# Patient Record
Sex: FEMALE | Race: WHITE | Employment: UNEMPLOYED | URBAN - METROPOLITAN AREA
[De-identification: names, ages, dates, MRNs, and addresses within clinical notes are randomized per-mention and may not be internally consistent; named-entity substitution may affect disease eponyms.]

---

## 2017-05-30 ENCOUNTER — AMBULATORY - RIVER FALLS (OUTPATIENT)
Dept: FAMILY MEDICINE | Facility: CLINIC | Age: 27
End: 2017-05-30

## 2018-06-18 ENCOUNTER — APPOINTMENT (OUTPATIENT)
Dept: CT IMAGING | Facility: CLINIC | Age: 28
End: 2018-06-18
Attending: EMERGENCY MEDICINE
Payer: COMMERCIAL

## 2018-06-18 ENCOUNTER — HOSPITAL ENCOUNTER (EMERGENCY)
Facility: CLINIC | Age: 28
Discharge: HOME | End: 2018-06-18
Attending: EMERGENCY MEDICINE | Admitting: EMERGENCY MEDICINE
Payer: COMMERCIAL

## 2018-06-18 VITALS
SYSTOLIC BLOOD PRESSURE: 115 MMHG | OXYGEN SATURATION: 100 % | RESPIRATION RATE: 26 BRPM | HEART RATE: 97 BPM | TEMPERATURE: 96.8 F | DIASTOLIC BLOOD PRESSURE: 63 MMHG

## 2018-06-18 DIAGNOSIS — R11.10 VOMITING AND DIARRHEA: ICD-10-CM

## 2018-06-18 DIAGNOSIS — R19.7 VOMITING AND DIARRHEA: ICD-10-CM

## 2018-06-18 DIAGNOSIS — R14.2 FLATULENCE, ERUCTATION, AND GAS PAIN: ICD-10-CM

## 2018-06-18 DIAGNOSIS — R14.3 FLATULENCE, ERUCTATION, AND GAS PAIN: ICD-10-CM

## 2018-06-18 DIAGNOSIS — R14.1 FLATULENCE, ERUCTATION, AND GAS PAIN: ICD-10-CM

## 2018-06-18 DIAGNOSIS — K52.9 GASTROENTERITIS: ICD-10-CM

## 2018-06-18 LAB
ALBUMIN SERPL-MCNC: 3.8 G/DL (ref 3.4–5)
ALP SERPL-CCNC: 65 U/L (ref 40–150)
ALT SERPL W P-5'-P-CCNC: 20 U/L (ref 0–50)
ANION GAP SERPL CALCULATED.3IONS-SCNC: 7 MMOL/L (ref 3–14)
AST SERPL W P-5'-P-CCNC: 19 U/L (ref 0–45)
B-HCG FREE SERPL-ACNC: <5 IU/L
BASOPHILS # BLD AUTO: 0 10E9/L (ref 0–0.2)
BASOPHILS NFR BLD AUTO: 0.5 %
BILIRUB DIRECT SERPL-MCNC: 0.1 MG/DL (ref 0–0.2)
BILIRUB SERPL-MCNC: 1.3 MG/DL (ref 0.2–1.3)
BUN SERPL-MCNC: 13 MG/DL (ref 7–30)
CALCIUM SERPL-MCNC: 8.3 MG/DL (ref 8.5–10.1)
CHLORIDE SERPL-SCNC: 109 MMOL/L (ref 94–109)
CO2 SERPL-SCNC: 23 MMOL/L (ref 20–32)
CREAT SERPL-MCNC: 0.95 MG/DL (ref 0.52–1.04)
DIFFERENTIAL METHOD BLD: NORMAL
EOSINOPHIL # BLD AUTO: 0.1 10E9/L (ref 0–0.7)
EOSINOPHIL NFR BLD AUTO: 1.6 %
ERYTHROCYTE [DISTWIDTH] IN BLOOD BY AUTOMATED COUNT: 13.1 % (ref 10–15)
GFR SERPL CREATININE-BSD FRML MDRD: 71 ML/MIN/1.7M2
GLUCOSE SERPL-MCNC: 98 MG/DL (ref 70–99)
HCT VFR BLD AUTO: 42.2 % (ref 35–47)
HGB BLD-MCNC: 14.4 G/DL (ref 11.7–15.7)
IMM GRANULOCYTES # BLD: 0.1 10E9/L (ref 0–0.4)
IMM GRANULOCYTES NFR BLD: 0.7 %
LIPASE SERPL-CCNC: 108 U/L (ref 73–393)
LYMPHOCYTES # BLD AUTO: 1.5 10E9/L (ref 0.8–5.3)
LYMPHOCYTES NFR BLD AUTO: 17 %
MCH RBC QN AUTO: 30.3 PG (ref 26.5–33)
MCHC RBC AUTO-ENTMCNC: 34.1 G/DL (ref 31.5–36.5)
MCV RBC AUTO: 89 FL (ref 78–100)
MONOCYTES # BLD AUTO: 0.6 10E9/L (ref 0–1.3)
MONOCYTES NFR BLD AUTO: 7.3 %
NEUTROPHILS # BLD AUTO: 6.3 10E9/L (ref 1.6–8.3)
NEUTROPHILS NFR BLD AUTO: 72.9 %
NRBC # BLD AUTO: 0 10*3/UL
NRBC BLD AUTO-RTO: 0 /100
PLATELET # BLD AUTO: 207 10E9/L (ref 150–450)
POTASSIUM SERPL-SCNC: 3.2 MMOL/L (ref 3.4–5.3)
PROT SERPL-MCNC: 7.1 G/DL (ref 6.8–8.8)
RBC # BLD AUTO: 4.75 10E12/L (ref 3.8–5.2)
SODIUM SERPL-SCNC: 139 MMOL/L (ref 133–144)
WBC # BLD AUTO: 8.7 10E9/L (ref 4–11)

## 2018-06-18 PROCEDURE — 87493 C DIFF AMPLIFIED PROBE: CPT | Performed by: EMERGENCY MEDICINE

## 2018-06-18 PROCEDURE — 83690 ASSAY OF LIPASE: CPT | Performed by: EMERGENCY MEDICINE

## 2018-06-18 PROCEDURE — 25000128 H RX IP 250 OP 636: Performed by: EMERGENCY MEDICINE

## 2018-06-18 PROCEDURE — 87506 IADNA-DNA/RNA PROBE TQ 6-11: CPT | Performed by: EMERGENCY MEDICINE

## 2018-06-18 PROCEDURE — 80048 BASIC METABOLIC PNL TOTAL CA: CPT | Performed by: EMERGENCY MEDICINE

## 2018-06-18 PROCEDURE — 74177 CT ABD & PELVIS W/CONTRAST: CPT

## 2018-06-18 PROCEDURE — 25000132 ZZH RX MED GY IP 250 OP 250 PS 637: Performed by: EMERGENCY MEDICINE

## 2018-06-18 PROCEDURE — 96361 HYDRATE IV INFUSION ADD-ON: CPT

## 2018-06-18 PROCEDURE — 84702 CHORIONIC GONADOTROPIN TEST: CPT

## 2018-06-18 PROCEDURE — 80076 HEPATIC FUNCTION PANEL: CPT | Performed by: EMERGENCY MEDICINE

## 2018-06-18 PROCEDURE — 96375 TX/PRO/DX INJ NEW DRUG ADDON: CPT

## 2018-06-18 PROCEDURE — 99285 EMERGENCY DEPT VISIT HI MDM: CPT | Mod: 25

## 2018-06-18 PROCEDURE — 85025 COMPLETE CBC W/AUTO DIFF WBC: CPT | Performed by: EMERGENCY MEDICINE

## 2018-06-18 PROCEDURE — 96374 THER/PROPH/DIAG INJ IV PUSH: CPT

## 2018-06-18 RX ORDER — MORPHINE SULFATE 4 MG/ML
4 INJECTION, SOLUTION INTRAMUSCULAR; INTRAVENOUS ONCE
Status: COMPLETED | OUTPATIENT
Start: 2018-06-18 | End: 2018-06-18

## 2018-06-18 RX ORDER — POTASSIUM CHLORIDE 1500 MG/1
40 TABLET, EXTENDED RELEASE ORAL ONCE
Status: COMPLETED | OUTPATIENT
Start: 2018-06-18 | End: 2018-06-18

## 2018-06-18 RX ORDER — LOPERAMIDE HYDROCHLORIDE 2 MG/1
2 TABLET ORAL PRN
Qty: 20 TABLET | Refills: 0 | Status: ON HOLD | OUTPATIENT
Start: 2018-06-18 | End: 2019-05-26

## 2018-06-18 RX ORDER — ONDANSETRON 2 MG/ML
4 INJECTION INTRAMUSCULAR; INTRAVENOUS ONCE
Status: COMPLETED | OUTPATIENT
Start: 2018-06-18 | End: 2018-06-18

## 2018-06-18 RX ORDER — DICYCLOMINE HCL 20 MG
20 TABLET ORAL EVERY 6 HOURS PRN
Qty: 16 TABLET | Refills: 0 | Status: SHIPPED | OUTPATIENT
Start: 2018-06-18 | End: 2018-06-22

## 2018-06-18 RX ORDER — IOPAMIDOL 755 MG/ML
500 INJECTION, SOLUTION INTRAVASCULAR ONCE
Status: COMPLETED | OUTPATIENT
Start: 2018-06-18 | End: 2018-06-18

## 2018-06-18 RX ORDER — DICYCLOMINE HYDROCHLORIDE 10 MG/1
20 CAPSULE ORAL ONCE
Status: COMPLETED | OUTPATIENT
Start: 2018-06-18 | End: 2018-06-18

## 2018-06-18 RX ORDER — ONDANSETRON 4 MG/1
4 TABLET, ORALLY DISINTEGRATING ORAL EVERY 8 HOURS PRN
Qty: 10 TABLET | Refills: 0 | Status: SHIPPED | OUTPATIENT
Start: 2018-06-18 | End: 2018-06-21

## 2018-06-18 RX ADMIN — POTASSIUM CHLORIDE 40 MEQ: 1500 TABLET, EXTENDED RELEASE ORAL at 22:39

## 2018-06-18 RX ADMIN — MORPHINE SULFATE 4 MG: 4 INJECTION, SOLUTION INTRAMUSCULAR; INTRAVENOUS at 21:32

## 2018-06-18 RX ADMIN — MORPHINE SULFATE 4 MG: 4 INJECTION INTRAVENOUS at 21:44

## 2018-06-18 RX ADMIN — SODIUM CHLORIDE 1000 ML: 9 INJECTION, SOLUTION INTRAVENOUS at 21:30

## 2018-06-18 RX ADMIN — DICYCLOMINE HYDROCHLORIDE 20 MG: 10 CAPSULE ORAL at 22:49

## 2018-06-18 RX ADMIN — IOPAMIDOL 75 ML: 755 INJECTION, SOLUTION INTRAVENOUS at 21:29

## 2018-06-18 RX ADMIN — SODIUM CHLORIDE 59 ML: 9 INJECTION, SOLUTION INTRAVENOUS at 21:29

## 2018-06-18 RX ADMIN — ONDANSETRON 4 MG: 2 INJECTION INTRAMUSCULAR; INTRAVENOUS at 21:30

## 2018-06-18 ASSESSMENT — ENCOUNTER SYMPTOMS
BLOOD IN STOOL: 0
DIARRHEA: 1
FREQUENCY: 0
ABDOMINAL PAIN: 1
DYSURIA: 0
VOMITING: 1
NAUSEA: 1

## 2018-06-18 NOTE — ED AVS SNAPSHOT
[facility] Emergency Department    [address]    [facility] [address]    Phone:  [phone]    Fax:  [phone]                                       Laura Song   MRN: [id number]    Department:  [facility] Emergency Department   Date of Visit:  6/18/2018           After Visit Summary Signature Page     I have received my discharge instructions, and my questions have been answered. I have discussed any challenges I see with this plan with the nurse or doctor.    ..........................................................................................................................................  Patient/Patient Representative Signature      ..........................................................................................................................................  Patient Representative Print Name and Relationship to Patient    ..................................................               ................................................  Date                                            Time    ..........................................................................................................................................  Reviewed by Signature/Title    ...................................................              ..............................................  Date                                                            Time

## 2018-06-18 NOTE — ED AVS SNAPSHOT
[facility] Emergency Department    [address]    [facility] [address]    Phone:  [phone]    Fax:  [phone]                                       Laura Song   MRN: [id number]    Department:  [facility] Emergency Department   Date of Visit:  6/18/2018           Patient Information     Date Of Birth          1990        Your diagnoses for this visit were:     Gastroenteritis     Flatulence, eructation, and gas pain     Vomiting and diarrhea        You were seen by Sofia Cottrell MD.      Follow-up Information     Follow up with Clinic, [facility] In 3 days.    Contact information:    [address] [facility] [address]  [phone]          Follow up with [facility] Emergency Department.    Specialty:  EMERGENCY MEDICINE    Why:  If symptoms worsen    Contact information:    [address]  [facility] [address]  [phone]        Discharge Instructions       Bentyl to help with cramping/bloating pain and possibly diarrhea.  Imodium as prescribed for diarrhea and Zofran as needed for nausea or vomiting.  You can also take Tylenol or ibuprofen for pain.  Eat very bland foods and advance diet as symptoms allow.  Follow up with primary care in 2-3 days to ensure you are improving as expected.  Return with new or concerning symptoms including uncontrolled pain.  You will be called if any pending tests are positive. Otherwise you will not be called with negative results.    Discharge References/Attachments     FOOD POISONING OR GASTROENTERITIS (ADULT) (ENGLISH)    GASTROENTERITIS, NONINFECTIOUS (ENGLISH)    VOMITING AND DIARRHEA, NONSPECIFIC (ADULT) (ENGLISH)      24 Hour Appointment Hotline       To make an appointment at any [facility], call [phone] ([phone]). If you don't have a family doctor or clinic, we will help you find one. [facility] are conveniently located to serve the  needs of you and your family.             Review of your medicines      START taking        Dose / Directions Last dose taken    dicyclomine 20 MG tablet   Commonly known as:  BENTYL   Dose:  20 mg   Quantity:  16 tablet        Take 1 tablet (20 mg) by mouth every 6 hours as needed   Refills:  0        loperamide 2 MG tablet   Commonly known as:  IMODIUM A-D   Dose:  2 mg   Quantity:  20 tablet        Take 1 tablet (2 mg) by mouth as needed for diarrhea Take 2 (4mg) tablets after first episode of diarrhea then 1 tablet (2mg) after each subsequent episode not to exceed 8 tablets (16mg) in 24 hours.   Refills:  0        ondansetron 4 MG ODT tab   Commonly known as:  ZOFRAN ODT   Dose:  4 mg   Quantity:  10 tablet        Take 1 tablet (4 mg) by mouth every 8 hours as needed for nausea   Refills:  0                Prescriptions were sent or printed at these locations (3 Prescriptions)                   Other Prescriptions                Printed at Department/Unit printer (3 of 3)         dicyclomine (BENTYL) 20 MG tablet               loperamide (IMODIUM A-D) 2 MG tablet               ondansetron (ZOFRAN ODT) 4 MG ODT tab                Procedures and tests performed during your visit     Basic metabolic panel    CBC with platelets differential    CT Abdomen Pelvis w Contrast    Clostridium difficile toxin B PCR    Enteric Bacteria and Virus Panel by HERMINIO Stool    Hepatic panel    ISTAT HCG Quantitative Pregnancy POCT    Lipase      Orders Needing Specimen Collection     None      Pending Results     Date and Time Order Name Status Description    6/18/2018 2135 Enteric Bacteria and Virus Panel by HERMINIO Stool In process     6/18/2018 2110 Clostridium difficile toxin B PCR In process             Pending Culture Results     Date and Time Order Name Status Description    6/18/2018 2135 Enteric Bacteria and Virus Panel by HERMINIO Stool In process     6/18/2018 2110 Clostridium difficile toxin B PCR In process             Pending  Results Instructions     If you had any lab results that were not finalized at the time of your Discharge, you can call the ED Lab Result RN at [phone]. You will be contacted by this team for any positive Lab results or changes in treatment. The nurses are available 7 days a week from 10A to 6:30P.  You can leave a message 24 hours per day and they will return your call.        Test Results From Your Hospital Stay        6/18/2018  8:50 PM      Component Results     Component Value Ref Range & Units Status    Sodium 139 133 - 144 mmol/L Final    Potassium 3.2 (L) 3.4 - 5.3 mmol/L Final    Chloride 109 94 - 109 mmol/L Final    Carbon Dioxide 23 20 - 32 mmol/L Final    Anion Gap 7 3 - 14 mmol/L Final    Glucose 98 70 - 99 mg/dL Final    Urea Nitrogen 13 7 - 30 mg/dL Final    Creatinine 0.95 0.52 - 1.04 mg/dL Final    GFR Estimate 71 >60 mL/min/1.7m2 Final    Non  GFR Calc    GFR Estimate If Black 86 >60 mL/min/1.7m2 Final    African American GFR Calc    Calcium 8.3 (L) 8.5 - 10.1 mg/dL Final         6/18/2018  8:35 PM      Component Results     Component Value Ref Range & Units Status    WBC 8.7 4.0 - 11.0 10e9/L Final    RBC Count 4.75 3.8 - 5.2 10e12/L Final    Hemoglobin 14.4 11.7 - 15.7 g/dL Final    Hematocrit 42.2 35.0 - 47.0 % Final    MCV 89 78 - 100 fl Final    MCH 30.3 26.5 - 33.0 pg Final    MCHC 34.1 31.5 - 36.5 g/dL Final    RDW 13.1 10.0 - 15.0 % Final    Platelet Count 207 150 - 450 10e9/L Final    Diff Method Automated Method  Final    % Neutrophils 72.9 % Final    % Lymphocytes 17.0 % Final    % Monocytes 7.3 % Final    % Eosinophils 1.6 % Final    % Basophils 0.5 % Final    % Immature Granulocytes 0.7 % Final    Nucleated RBCs 0 0 /100 Final    Absolute Neutrophil 6.3 1.6 - 8.3 10e9/L Final    Absolute Lymphocytes 1.5 0.8 - 5.3 10e9/L Final    Absolute Monocytes 0.6 0.0 - 1.3 10e9/L Final    Absolute Eosinophils 0.1 0.0 - 0.7 10e9/L Final    Absolute Basophils 0.0 0.0 - 0.2  10e9/L Final    Abs Immature Granulocytes 0.1 0 - 0.4 10e9/L Final    Absolute Nucleated RBC 0.0  Final         6/18/2018  9:04 PM      Component Results     Component Value Ref Range & Units Status    HCG Quantitative Serum <5.0 <5.0 IU/L Final         6/18/2018 10:10 PM      Narrative     CT ABDOMEN AND PELVIS WITH CONTRAST 6/18/2018 9:36 PM     HISTORY: Abdominal pain, generalized, including right lower quadrant.       TECHNIQUE: Volumetric acquisition through abdomen and pelvis with  IV  contrast. 79 mL Isovue-370  Radiation dose for this scan was reduced  using automated exposure control, adjustment of the mA and/or kV  according to patient size, or iterative reconstruction technique.    COMPARISON: None.    FINDINGS: The liver, gallbladder, spleen, pancreas, adrenal glands and  kidneys demonstrate no worrisome findings. Visualized lung bases are  clear.    Colon is mildly distended with gas and fluid. There is also increased  small bowel fluid and borderline-prominent loops without convincing  obstruction.    Uterus is present. No suspicious pelvic masses. Small amount of free  pelvic fluid. No loculated fluid collections or free air.        Impression     IMPRESSION:  1. Colon is mildly distended with fluid and gas and there is increased  small bowel fluid. Findings are nonspecific but suggest enteritis.  2. No other acute findings.    DANNIE SUTHERLAND MD         6/18/2018  9:33 PM         6/18/2018  9:37 PM      Component Results     Component Value Ref Range & Units Status    Bilirubin Direct 0.1 0.0 - 0.2 mg/dL Final    Bilirubin Total 1.3 0.2 - 1.3 mg/dL Final    Albumin 3.8 3.4 - 5.0 g/dL Final    Protein Total 7.1 6.8 - 8.8 g/dL Final    Alkaline Phosphatase 65 40 - 150 U/L Final    ALT 20 0 - 50 U/L Final    AST 19 0 - 45 U/L Final         6/18/2018  9:19 PM      Component Results     Component Value Ref Range & Units Status    Lipase 108 73 - 393 U/L Final         6/18/2018 10:04 PM                 Clinical Quality Measure: Blood Pressure Screening     Your blood pressure was checked while you were in the emergency department today. The last reading we obtained was  BP: 115/63 . Please read the guidelines below about what these numbers mean and what you should do about them.  If your systolic blood pressure (the top number) is less than 120 and your diastolic blood pressure (the bottom number) is less than 80, then your blood pressure is normal. There is nothing more that you need to do about it.  If your systolic blood pressure (the top number) is 120-139 or your diastolic blood pressure (the bottom number) is 80-89, your blood pressure may be higher than it should be. You should have your blood pressure rechecked within a year by a primary care provider.  If your systolic blood pressure (the top number) is 140 or greater or your diastolic blood pressure (the bottom number) is 90 or greater, you may have high blood pressure. High blood pressure is treatable, but if left untreated over time it can put you at risk for heart attack, stroke, or kidney failure. You should have your blood pressure rechecked by a primary care provider within the next 4 weeks.  If your provider in the emergency department today gave you specific instructions to follow-up with your doctor or provider even sooner than that, you should follow that instruction and not wait for up to 4 weeks for your follow-up visit.        Thank you for choosing [facility]       Thank you for choosing [facility] for your care. Our goal is always to provide you with excellent care. Hearing back from our patients is one way we can continue to improve our services. Please take a few minutes to complete the written survey that you may receive in the mail after you visit with us. Thank you!        [facility] Information     [employer] lets you send messages to your doctor, view your test results, renew your prescriptions, schedule appointments and more. To sign up,  "go to [email or web address] . Click on \"Log in\" on the left side of the screen, which will take you to the Welcome page. Then click on \"Sign up Now\" on the right side of the page.     You will be asked to enter the access code listed below, as well as some personal information. Please follow the directions to create your username and password.     Your access code is: [email or web address]  Expires: 2018 11:07 PM     Your access code will  in 90 days. If you need help or a new code, please call your [facility] clinic or [phone].        Care EveryWhere ID     This is your Care EveryWhere ID. This could be used by other organizations to access your [facility] medical records  [id number]        Equal Access to Services     JENNA HARRIS : Raghavendra Swanson, michelle key, kay velarde, maral young . So [facility] [phone].    ATENCIÓN: Si habla español, tiene a malhotra disposición servicios gratuitos de asistencia lingüística. Zac al [phone].    We comply with applicable federal civil rights laws and [place] laws. We do not discriminate on the basis of race, color, national origin, age, disability, sex, sexual orientation, or gender identity.            After Visit Summary       This is your record. Keep this with you and show to your community pharmacist(s) and doctor(s) at your next visit.                  "

## 2018-06-19 LAB
C COLI+JEJUNI+LARI FUSA STL QL NAA+PROBE: NOT DETECTED
C DIFF TOX B STL QL: NEGATIVE
EC STX1 GENE STL QL NAA+PROBE: NOT DETECTED
EC STX2 GENE STL QL NAA+PROBE: NOT DETECTED
ENTERIC PATHOGEN COMMENT: NORMAL
NOROV GI+II ORF1-ORF2 JNC STL QL NAA+PR: NOT DETECTED
RVA NSP5 STL QL NAA+PROBE: NOT DETECTED
SALMONELLA SP RPOD STL QL NAA+PROBE: NOT DETECTED
SHIGELLA SP+EIEC IPAH STL QL NAA+PROBE: NOT DETECTED
SPECIMEN SOURCE: NORMAL
V CHOL+PARA RFBL+TRKH+TNAA STL QL NAA+PR: NOT DETECTED
Y ENTERO RECN STL QL NAA+PROBE: NOT DETECTED

## 2018-06-19 ASSESSMENT — ENCOUNTER SYMPTOMS: FEVER: 0

## 2018-06-19 NOTE — ED TRIAGE NOTES
Pt c/o abd cramping and diarrhea with dizziness. occ NV.    Pt A&O x 3, CMS x 3, ABCD's adequate in triage

## 2018-06-19 NOTE — ED NOTES
Pt provided with discharge paperwork and educated on recommended follow-up with PCP. Pt educated on how to manage symptoms at home and how to take prescribed medications. Pt voiced understanding and denied any questions at discharge.

## 2018-06-19 NOTE — ED PROVIDER NOTES
"  History     Chief Complaint:  Abdominal pain, nausea, diarrhea, and vomiting     The history is provided by the patient.      Laura Song is a generally healthy 27 year old female who presents to the emergency department today for evaluation of abdominal pain, nausea, diarrhea, and vomiting. The patient reported she started feeling nauseous yesterday, like she was \"hung over,\" but denies having been drinking. She then reports that today she felt bad gas pain in her abdomen and started having diarrhea in the morning which progressively got worse. She reports more than 30 episodes of non-bloody, watery diarrhea and 3 episodes of non-bloody vomiting. She states she took some Pepto bismol and an antidiarrheal with no relief. She reports her abdominal pain is generalized and cramping which she compares to labor pains. It does radiate toward her back and seems to come in waves, but never completely subsides. No exacerbating factors. She denies fever, dysuria, urinary frequncy or urgency, or vaginal discharge. Of note, patient recently completed Augmentin for sinusitis.    Allergies:  Reglan     Medications:    Adderall  Cymbalta    Past Medical History:    Panic disorder  ADHD  BRAEDEN  Depression  Asthma  Chronic lower back pain  Alcohol dependence  Endometriosis     Past Surgical History:    2 C-sections  Laparoscopy for endometriosis    Family History:    History reviewed. No pertinent family history.      Social History:  The patient was accompanied to the ED by family.  Smoking Status: Never Smoker  Smokeless Tobacco: Never Used  Alcohol Use: Positive   Marital Status:        Review of Systems   Constitutional: Negative for fever.   Gastrointestinal: Positive for abdominal pain, diarrhea, nausea and vomiting. Negative for blood in stool.   Genitourinary: Negative for dysuria, frequency, urgency and vaginal discharge.   All other systems reviewed and are negative.      Physical Exam     Patient Vitals for " the past 24 hrs:   BP Temp Temp src Pulse Resp SpO2   06/18/18 2300 115/63 - - - - -   06/18/18 2245 115/71 - - - - -   06/18/18 2230 117/77 - - - - 100 %   06/18/18 2215 118/72 - - - - 100 %   06/18/18 2200 101/69 - - - - 100 %   06/18/18 2148 - - - - - 100 %   06/18/18 2146 130/85 - - - - -   06/18/18 1945 110/82 96.8  F (36  C) Temporal 97 26 100 %      Physical Exam  General: Well-developed and well-nourished. Uncomfortable appearing young  woman. Cooperative.  Head:  Atraumatic.  Eyes:  Conjunctivae, lids, and sclerae are normal.  ENT:    Normal nose. Moist mucous membranes.  Neck:  Supple. Normal range of motion.  CV:  Regular rate and rhythm. Normal heart sounds with no murmurs, rubs, or gallops detected.  Resp:  No respiratory distress. Clear to auscultation bilaterally without decreased breath sounds, wheezing, rales, or rhonchi.  GI:  Soft. Non-distended. Diffuse moderate tenderness to palpation.     MS:  Normal ROM.   Skin:  Warm. Non-diaphoretic. No pallor.  Neuro:  Awake. A&Ox3. Normal strength.  Psych: Normal mood and affect. Normal speech.  Vitals reviewed.    Emergency Department Course     Imaging:  Radiology findings were communicated with the patient who voiced understanding of the findings.    CT Abdomen Pelvis w Contrast  1. Colon is mildly distended with fluid and gas and there is increased  small bowel fluid. Findings are nonspecific but suggest enteritis.  2. No other acute findings.  DANNIE SUTHERLAND MD  Reading per radiology    Laboratory:  Laboratory findings were communicated with the patient who voiced understanding of the findings.    ISTAT HCG quantitative pregnancy POCT: <5.0  CBC: WBC 8.7, HGB 14.4,   BMP: potassium 3.2 (L) o/w WNL (Creatinine 0.95)  Clostridium difficile toxin B PCR: pending  Enteric bacteria and virus panel by HERMINIO stool: pending     Interventions:  2130 NS 1000 mL IV  2130 Zofran 4 mg IV  2132 Morphine 4 mg IV  2144 Morphine 4 mg IV  2239 Potassium  "chloride 40 mEq  2249 Bentyl 20 mg Oral    Emergency Department Course:    1953 Nursing notes and vitals reviewed.    2020 IV was inserted and blood was drawn for laboratory testing, results above.     2104 I performed an exam of the patient as documented above.     2123 The patient was sent for a abdominal and pelvic CT while in the emergency department, results above.      2234 I updated the patient. States morphine did not significantly change her bloated, cramping \"gas pains.\" Will try Bentyl.    2310 I personally reviewed the imaging and laboratory results with the patient and answered all related questions prior to discharge.    Impression & Plan      Medical Decision Making:  Laura is a 27 year old female who presents with generalized abdominal pain that she describes as cramping in nature associated with vomiting and multiple episodes of watery, nonbloody diarrhea.  No other concerns or complaints.  Patient has generalized tenderness to palpation on exam and otherwise is well-appearing.  Patient was given Zofran, morphine, and IV fluids during her workup.  Fortunately, workup is unremarkable with no leukocytosis.  Creatinine is normal at 0.95 though she has a mild hypokalemia to 3.2 which I repleted with 40 mEq potassium chloride by mouth.  A stool sample was collected and sent for enteric HERMINIO.  Further, patient has recently been on antibiotics so I did send Clostridium difficile PCR.  I believe empiric treatment can safely be deferred until the testing for these pathogens has resulted.  Finally, CT of the abdomen and pelvis reveals only colonic distention with fluid and gas consistent with enteritis.  Thus, patient symptoms are most consistent with a gastroenteritis.  Most commonly this is viral and supportive care is best course of action for treatment.  Again, antibiotics can safely be deferred.  Patient was reevaluated and states morphine did not really help her pain as it seems to be cramping and " bloating primarily.  I did give her Bentyl for this and recommended she continue at home.  Patient has no signs of severe or overwhelming illness and further workup or admission is not indicated. Treatment will consistent of supportive care and symptom control at home.  I discussed appropriate use of Imodium and also provided Zofran as needed for nausea and vomiting.  I recommended she try Tylenol or ibuprofen for pain and eat very bland foods and advance diet only as tolerated.  I recommend follow-up with her primary care provider in the next couple of days to ensure she is improving as expected but provided strict return precautions in the interim.  I answered all patient and her family's questions and they verbalized understanding.  Amenable to discharge.    Diagnosis:    ICD-10-CM    1. Gastroenteritis K52.9    2. Flatulence, eructation, and gas pain R14.3     R14.1     R14.2    3. Vomiting and diarrhea R11.10     R19.7      Disposition:   The patient was discharged home.     Discharge Medications:  New Prescriptions    DICYCLOMINE (BENTYL) 20 MG TABLET    Take 1 tablet (20 mg) by mouth every 6 hours as needed    LOPERAMIDE (IMODIUM A-D) 2 MG TABLET    Take 1 tablet (2 mg) by mouth as needed for diarrhea Take 2 (4mg) tablets after first episode of diarrhea then 1 tablet (2mg) after each subsequent episode not to exceed 8 tablets (16mg) in 24 hours.    ONDANSETRON (ZOFRAN ODT) 4 MG ODT TAB    Take 1 tablet (4 mg) by mouth every 8 hours as needed for nausea     Scribe Disclosure:  I, Chio Lloyd, am serving as a scribe at 7:55 PM on 6/18/2018 to document services personally performed by Sofia Cottrell MD based on my observations and the provider's statements to me.     [facility] EMERGENCY DEPARTMENT       Sofia Cottrell MD  06/19/18 2122

## 2018-06-19 NOTE — DISCHARGE INSTRUCTIONS
Bentyl to help with cramping/bloating pain and possibly diarrhea.  Imodium as prescribed for diarrhea and Zofran as needed for nausea or vomiting.  You can also take Tylenol or ibuprofen for pain.  Eat very bland foods and advance diet as symptoms allow.  Follow up with primary care in 2-3 days to ensure you are improving as expected.  Return with new or concerning symptoms including uncontrolled pain.  You will be called if any pending tests are positive. Otherwise you will not be called with negative results.

## 2018-10-24 LAB
HBV SURFACE AG SERPL QL IA: NEGATIVE
HIV 1+2 AB+HIV1 P24 AG SERPL QL IA: NEGATIVE
RUBELLA ANTIBODY IGG QUANTITATIVE: NORMAL IU/ML
TREPONEMA ANTIBODIES: NEGATIVE

## 2019-04-12 RX ORDER — CITRIC ACID/SODIUM CITRATE 334-500MG
30 SOLUTION, ORAL ORAL
Status: CANCELLED | OUTPATIENT
Start: 2019-04-12

## 2019-04-12 RX ORDER — SODIUM CHLORIDE, SODIUM LACTATE, POTASSIUM CHLORIDE, CALCIUM CHLORIDE 600; 310; 30; 20 MG/100ML; MG/100ML; MG/100ML; MG/100ML
INJECTION, SOLUTION INTRAVENOUS CONTINUOUS
Status: CANCELLED | OUTPATIENT
Start: 2019-04-12

## 2019-04-12 RX ORDER — CEFAZOLIN SODIUM 1 G/3ML
1 INJECTION, POWDER, FOR SOLUTION INTRAMUSCULAR; INTRAVENOUS SEE ADMIN INSTRUCTIONS
Status: CANCELLED | OUTPATIENT
Start: 2019-04-12

## 2019-04-12 RX ORDER — CEFAZOLIN SODIUM 2 G/100ML
2 INJECTION, SOLUTION INTRAVENOUS
Status: CANCELLED | OUTPATIENT
Start: 2019-04-12

## 2019-05-21 ENCOUNTER — HOSPITAL ENCOUNTER (OUTPATIENT)
Facility: CLINIC | Age: 29
Discharge: HOME | End: 2019-05-21
Attending: OBSTETRICS & GYNECOLOGY | Admitting: OBSTETRICS & GYNECOLOGY
Payer: COMMERCIAL

## 2019-05-21 VITALS
HEART RATE: 82 BPM | SYSTOLIC BLOOD PRESSURE: 133 MMHG | BODY MASS INDEX: 30.82 KG/M2 | WEIGHT: 185 LBS | RESPIRATION RATE: 18 BRPM | TEMPERATURE: 98.4 F | HEIGHT: 65 IN | DIASTOLIC BLOOD PRESSURE: 80 MMHG

## 2019-05-21 LAB
ALBUMIN UR-MCNC: NEGATIVE MG/DL
APPEARANCE UR: CLEAR
BILIRUB UR QL STRIP: NEGATIVE
COLOR UR AUTO: ABNORMAL
GLUCOSE UR STRIP-MCNC: NEGATIVE MG/DL
HGB UR QL STRIP: NEGATIVE
KETONES UR STRIP-MCNC: NEGATIVE MG/DL
LEUKOCYTE ESTERASE UR QL STRIP: NEGATIVE
MUCOUS THREADS #/AREA URNS LPF: PRESENT /LPF
NITRATE UR QL: NEGATIVE
PH UR STRIP: 6.5 PH (ref 5–7)
RBC #/AREA URNS AUTO: 1 /HPF (ref 0–2)
SOURCE: ABNORMAL
SP GR UR STRIP: 1.01 (ref 1–1.03)
SQUAMOUS #/AREA URNS AUTO: 4 /HPF (ref 0–1)
UROBILINOGEN UR STRIP-MCNC: NORMAL MG/DL (ref 0–2)
WBC #/AREA URNS AUTO: 1 /HPF (ref 0–5)

## 2019-05-21 PROCEDURE — G0463 HOSPITAL OUTPT CLINIC VISIT: HCPCS | Mod: 25

## 2019-05-21 PROCEDURE — 25000132 ZZH RX MED GY IP 250 OP 250 PS 637: Performed by: OBSTETRICS & GYNECOLOGY

## 2019-05-21 PROCEDURE — 81001 URINALYSIS AUTO W/SCOPE: CPT | Performed by: OBSTETRICS & GYNECOLOGY

## 2019-05-21 PROCEDURE — 59025 FETAL NON-STRESS TEST: CPT

## 2019-05-21 RX ORDER — PRENATAL VIT/IRON FUM/FOLIC AC 27MG-0.8MG
1 TABLET ORAL DAILY
COMMUNITY

## 2019-05-21 RX ORDER — CYCLOBENZAPRINE HCL 5 MG
5 TABLET ORAL 3 TIMES DAILY PRN
Status: ON HOLD | COMMUNITY
End: 2019-06-07

## 2019-05-21 RX ORDER — ONDANSETRON 2 MG/ML
4 INJECTION INTRAMUSCULAR; INTRAVENOUS EVERY 6 HOURS PRN
Status: DISCONTINUED | OUTPATIENT
Start: 2019-05-21 | End: 2019-05-21 | Stop reason: HOSPADM

## 2019-05-21 RX ORDER — TERCONAZOLE 0.4 %
1 CREAM WITH APPLICATOR VAGINAL AT BEDTIME
Status: ON HOLD | COMMUNITY
End: 2019-05-26

## 2019-05-21 RX ORDER — ACETAMINOPHEN 500 MG
1000 TABLET ORAL ONCE
Status: COMPLETED | OUTPATIENT
Start: 2019-05-21 | End: 2019-05-21

## 2019-05-21 RX ORDER — CYCLOBENZAPRINE HCL 10 MG
10 TABLET ORAL ONCE
Status: COMPLETED | OUTPATIENT
Start: 2019-05-21 | End: 2019-05-21

## 2019-05-21 RX ADMIN — CYCLOBENZAPRINE HYDROCHLORIDE 10 MG: 10 TABLET, FILM COATED ORAL at 18:39

## 2019-05-21 RX ADMIN — ACETAMINOPHEN 1000 MG: 500 TABLET, FILM COATED ORAL at 17:43

## 2019-05-21 ASSESSMENT — MIFFLIN-ST. JEOR: SCORE: 1575.03

## 2019-05-21 NOTE — PROGRESS NOTES
"OB observation note    HPI: Patient presents to OBS for evaluation of moderate lower back pain and mild contractions. Reports onset of back pain earlier today followed by uterine tightening the past 2 hours. Denies trauma or injury. States her contractions are tight, but not painful. Denies vaginal bleeding or leakage of fluid. Baby is active. Patient endorses recent vaginal yeast infection for which she is currently taking Monistat (day 4/). She denies fevers, chills, nausea, vomiting. No dysuria, frequency or urgency. She last ate approximately 2 hours ago. Of note, patient has been taking occasional Flexeril for lower uterine cramping to help her sleep, last dose 2 days ago.     OB hx -   1. Term  \"emergency\"   2. Repeat  at 36+6, PROM    GYN hx -   No abnormal paps  Endometriosis diagnosed on laparoscopy     Med hx-   1. Anxiety  2. Depression  3. Migraines  4. Endometriosis  5. Genital herpes    Surg hx -  1. T&A as a child  2. Laparoscopy  for endometriosis  3. IUD insertion/removal  4. Nexplanon insertion/removal  5.  x 2 ( & )    Meds  1. Prenatal vitamins  2. Flexeril    Allergies - Reglan \"panic\"  Soc hx - , h/o tobacco use (quit ), no current alcohol use    O:   Vitals:    19 1735   BP: 133/80   Pulse: 82   Resp: 18   Temp: 98.4  F (36.9  C)   TempSrc: Oral   Weight: 83.9 kg (185 lb)   Height: 1.651 m (5' 5\")     NAD but uncomfortable appearing when changing positions.  LCTAB  RRR  Abd - gravid, non tender.  Moderate right CVA tenderness, mild left CVA tenderness.    FHT - 140, moderate variability, ++accels, no decels (category 1)  [place] - irritability with occ ctx  SVE - cervix closed per RN    U/A - neg blood, neg protein, neg leukocytes, SG 1.007    A/P 29 yo  @ 33+5 with acute onset low back pain.  1.  Ddx on presentation included labor, abruption, pyleonephritis, nephrolithiasis or muscloskeletal pain.  Based on negative " urine sample and lack of fever, very unlikely infection or nephrolithiasis.  2.  No evidence of labor or abruption. Fetal status reassuring.  3.  Likely musculoskeletal - treat with tylenol, Flexeril, heat.   4.  Anticipate discharge home later this evening. Follow up later this week in office.    Loni Walls MD

## 2019-05-21 NOTE — PROVIDER NOTIFICATION
19 1720   Provider Notification   Provider Name/Title Dr. Walls   Method of Notification At Bedside   Request Evaluate in Person   Notification Reason Pain    here at 33w4d for back pain and abdominal tightening.  EFM applied x2 and explained.  Pt states that the constant back pain started around 11am.  Orders received to send a UA.  Unable to collect a FFN due to a yeast infection and pt inserted the cream in the vagina last night.  Cervical exam.  Ok for tylenol.  Will notify MD of results.

## 2019-05-22 NOTE — PROVIDER NOTIFICATION
05/21/19 1943   Provider Notification   Provider Name/Title Dr. Walls   Method of Notification Phone   Request Evaluate - Remote   Notification Reason Status Update   Dr. Walls notified that pt's pain has decreased and feels comfortable going home.  She has an appointment this Friday. Orders received to discharge to home.

## 2019-05-22 NOTE — PROVIDER NOTIFICATION
05/21/19 1810   Provider Notification   Provider Name/Title Dr. Walls   Method of Notification Phone   Request Evaluate - Remote   Notification Reason Lab/Diagnostic Study;Status Update   Md notified of UA results which were normal, fht's, no contractions and still is having back pain.  Orders received to give pt a dose of flexeril 10mg once and watch for a little longer.

## 2019-05-22 NOTE — DISCHARGE INSTRUCTIONS
Discharge Instruction for Undelivered Patients      You were seen for: abdominal and back pain  We Consulted: Dr. Walls  You had (Test or Medicine):fetal monitoring, UA, oral flexeril, cervical exam     Diet:   Drink 8 to 12 glasses of liquids (milk, juice, water) every day.  You may eat meals and snacks.     Activity:  Count fetal kicks everyday (see handout)  Call your doctor or nurse midwife if your baby is moving less than usual.     Call your provider if you notice:  Swelling in your face or increased swelling in your hands or legs.  Headaches that are not relieved by Tylenol (acetaminophen).  Changes in your vision (blurring: seeing spots or stars.)  Nausea (sick to your stomach) and vomiting (throwing up).   Weight gain of 5 pounds or more per week.  Heartburn that doesn't go away.  Signs of bladder infection: pain when you urinate (use the toilet), need to go more often and more urgently.  The bag of vargas (rupture of membranes) breaks, or you notice leaking in your underwear.  Bright red blood in your underwear.  Abdominal (lower belly) or stomach pain.  Second (plus) baby: Contractions (tightening) less than 10 minutes apart and getting stronger.  *If less than 34 weeks: Contractions (tightenings) more than 6 times in one hour.  Increase or change in vaginal discharge (note the color and amount)  Other: Ok to take tylenol and flexeril if needed.    Follow-up:  As scheduled in the clinic

## 2019-05-22 NOTE — PLAN OF CARE
Data: Patient assessed in the [facility] for abdominal pain and back pain.  Cervical exam closed.  Membranes intact.  Contractions rare.  Action:  Presumed adequate fetal oxygenation documented (see flow record). Discharge instructions reviewed.  Patient instructed to report change in fetal movement, vaginal leaking of fluid or bleeding, abdominal pain, or any concerns related to the pregnancy to her nurse/physician.    Response: Orders to discharge home per Loni Walls Md.  Patient verbalized understanding of education and verbalized agreement with plan. Discharged to home at 1950.

## 2019-05-26 ENCOUNTER — HOSPITAL ENCOUNTER (OUTPATIENT)
Facility: CLINIC | Age: 29
LOS: 1 days | Discharge: HOME | End: 2019-05-26
Attending: OBSTETRICS & GYNECOLOGY | Admitting: OBSTETRICS & GYNECOLOGY
Payer: COMMERCIAL

## 2019-05-26 VITALS
SYSTOLIC BLOOD PRESSURE: 124 MMHG | RESPIRATION RATE: 18 BRPM | DIASTOLIC BLOOD PRESSURE: 72 MMHG | HEART RATE: 81 BPM | TEMPERATURE: 98.2 F

## 2019-05-26 LAB
ALT SERPL W P-5'-P-CCNC: 17 U/L (ref 0–50)
ANION GAP SERPL CALCULATED.3IONS-SCNC: 8 MMOL/L (ref 3–14)
AST SERPL W P-5'-P-CCNC: 19 U/L (ref 0–45)
BUN SERPL-MCNC: 6 MG/DL (ref 7–30)
CALCIUM SERPL-MCNC: 8.6 MG/DL (ref 8.5–10.1)
CHLORIDE SERPL-SCNC: 109 MMOL/L (ref 94–109)
CO2 SERPL-SCNC: 21 MMOL/L (ref 20–32)
CREAT SERPL-MCNC: 0.65 MG/DL (ref 0.52–1.04)
CREAT UR-MCNC: 43 MG/DL
ERYTHROCYTE [DISTWIDTH] IN BLOOD BY AUTOMATED COUNT: 12.6 % (ref 10–15)
GFR SERPL CREATININE-BSD FRML MDRD: >90 ML/MIN/{1.73_M2}
GLUCOSE SERPL-MCNC: 88 MG/DL (ref 70–99)
HCT VFR BLD AUTO: 33.6 % (ref 35–47)
HGB BLD-MCNC: 11.2 G/DL (ref 11.7–15.7)
MCH RBC QN AUTO: 28.5 PG (ref 26.5–33)
MCHC RBC AUTO-ENTMCNC: 33.3 G/DL (ref 31.5–36.5)
MCV RBC AUTO: 86 FL (ref 78–100)
PLATELET # BLD AUTO: 178 10E9/L (ref 150–450)
POTASSIUM SERPL-SCNC: 3.6 MMOL/L (ref 3.4–5.3)
PROT UR-MCNC: 0.07 G/L
PROT/CREAT 24H UR: 0.17 G/G CR (ref 0–0.2)
RBC # BLD AUTO: 3.93 10E12/L (ref 3.8–5.2)
SODIUM SERPL-SCNC: 138 MMOL/L (ref 133–144)
URATE SERPL-MCNC: 4.3 MG/DL (ref 2.6–6)
WBC # BLD AUTO: 8.5 10E9/L (ref 4–11)

## 2019-05-26 PROCEDURE — G0463 HOSPITAL OUTPT CLINIC VISIT: HCPCS

## 2019-05-26 PROCEDURE — 84450 TRANSFERASE (AST) (SGOT): CPT | Performed by: OBSTETRICS & GYNECOLOGY

## 2019-05-26 PROCEDURE — 84550 ASSAY OF BLOOD/URIC ACID: CPT | Performed by: OBSTETRICS & GYNECOLOGY

## 2019-05-26 PROCEDURE — 36415 COLL VENOUS BLD VENIPUNCTURE: CPT | Performed by: OBSTETRICS & GYNECOLOGY

## 2019-05-26 PROCEDURE — 59025 FETAL NON-STRESS TEST: CPT

## 2019-05-26 PROCEDURE — 84460 ALANINE AMINO (ALT) (SGPT): CPT | Performed by: OBSTETRICS & GYNECOLOGY

## 2019-05-26 PROCEDURE — 25000132 ZZH RX MED GY IP 250 OP 250 PS 637: Performed by: OBSTETRICS & GYNECOLOGY

## 2019-05-26 PROCEDURE — 80048 BASIC METABOLIC PNL TOTAL CA: CPT | Performed by: OBSTETRICS & GYNECOLOGY

## 2019-05-26 PROCEDURE — 84156 ASSAY OF PROTEIN URINE: CPT | Performed by: OBSTETRICS & GYNECOLOGY

## 2019-05-26 PROCEDURE — 25800030 ZZH RX IP 258 OP 636: Performed by: OBSTETRICS & GYNECOLOGY

## 2019-05-26 PROCEDURE — 85027 COMPLETE CBC AUTOMATED: CPT | Performed by: OBSTETRICS & GYNECOLOGY

## 2019-05-26 RX ORDER — NIFEDIPINE 10 MG/1
20 CAPSULE ORAL ONCE
Status: COMPLETED | OUTPATIENT
Start: 2019-05-26 | End: 2019-05-26

## 2019-05-26 RX ORDER — ONDANSETRON 2 MG/ML
4 INJECTION INTRAMUSCULAR; INTRAVENOUS EVERY 6 HOURS PRN
Status: DISCONTINUED | OUTPATIENT
Start: 2019-05-26 | End: 2019-05-27 | Stop reason: HOSPADM

## 2019-05-26 RX ORDER — SODIUM CHLORIDE, SODIUM LACTATE, POTASSIUM CHLORIDE, CALCIUM CHLORIDE 600; 310; 30; 20 MG/100ML; MG/100ML; MG/100ML; MG/100ML
INJECTION, SOLUTION INTRAVENOUS CONTINUOUS
Status: DISCONTINUED | OUTPATIENT
Start: 2019-05-26 | End: 2019-05-27 | Stop reason: HOSPADM

## 2019-05-26 RX ADMIN — NIFEDIPINE 20 MG: 10 CAPSULE ORAL at 21:01

## 2019-05-26 RX ADMIN — SODIUM CHLORIDE, POTASSIUM CHLORIDE, SODIUM LACTATE AND CALCIUM CHLORIDE: 600; 310; 30; 20 INJECTION, SOLUTION INTRAVENOUS at 21:39

## 2019-05-26 RX ADMIN — SODIUM CHLORIDE, POTASSIUM CHLORIDE, SODIUM LACTATE AND CALCIUM CHLORIDE: 600; 310; 30; 20 INJECTION, SOLUTION INTRAVENOUS at 19:04

## 2019-05-26 NOTE — PLAN OF CARE
Data: Patient presented to [facility]: 2019  5:10 PM.  Reason for maternal/fetal assessment is elevated blood pressures. Patient reports having a HA for 3 days, and today was at grocery store and used the bp cuff there and it was 140's/90's, pt also stated that her vision is not very clear at times d/t the HA.  Patient is a .  Prenatal record reviewed. Pregnancy has been uncomplicated..  Gestational Age 34w2d. VSS. Fetal movement present. Patient denies uterine contractions, leaking of vaginal fluid/rupture of membranes, vaginal bleeding, abdominal pain, pelvic pressure, nausea, vomiting, epigastric or URQ pain, significant edema. Support person is not present.   Action: Verbal consent for EFM. Triage assessment completed. Bill of rights reviewed.  Response: Patient verbalized agreement with plan. Will contact Dr Allyssa Shah with update and further orders.

## 2019-05-26 NOTE — PROVIDER NOTIFICATION
19 1840   Provider Notification   Provider Name/Title Dr. Shah   Method of Notification Phone   Notification Reason Status Update     Updated of pt arrival and Hx, pih labs results are WNL, reviewed bp results, x1 elevated otherwise wnl. Pt continues to have HA rated 3/10, utilized tylenol this am that did not give any relief, some visual disturbances with HA, pt does have Hx of migraines.  Strip was not reactive initially, mod variability/baseline 125's/no decels/x1 accel, extended reactive strip. Pt is claudia q5-8 min, initially pt stated was mild cramp, not reported as increasing in intensity of cramp and palpating mild.  Pt is rpt c/s and hx  labor.  Received telephone order for 1L of LR, and check bp's q30 min, and x1 dose of ibuprofen 400 mg for the HA. Telephone order with read back.

## 2019-05-27 NOTE — DISCHARGE INSTRUCTIONS
Discharge Instruction for Undelivered Patients      You were seen for: Labor Assessment and rule out preeclampsia  We Consulted: Dr. Shah  You had (Test or Medicine):fetal monitoring, blood work     Diet:   Drink 8 to 12 glasses of liquids (milk, juice, water) every day.  You may eat meals and snacks.     Activity:  Call your doctor or nurse midwife if your baby is moving less than usual.     Call your provider if you notice:  Swelling in your face or increased swelling in your hands or legs.  Headaches that are not relieved by Tylenol (acetaminophen).  Changes in your vision (blurring: seeing spots or stars.)  Nausea (sick to your stomach) and vomiting (throwing up).   Weight gain of 5 pounds or more per week.  Heartburn that doesn't go away.  Signs of bladder infection: pain when you urinate (use the toilet), need to go more often and more urgently.  The bag of vargas (rupture of membranes) breaks, or you notice leaking in your underwear.  Bright red blood in your underwear.  Abdominal (lower belly) or stomach pain.  For first baby: Contractions (tightening) less than 5 minutes apart for one hour or more.  Second (plus) baby: Contractions (tightening) less than 10 minutes apart and getting stronger.  *If less than 34 weeks: Contractions (tightenings) more than 6 times in one hour.  Increase or change in vaginal discharge (note the color and amount)  Other: Take it easy until you are seen in the clinic. No strenuous activity, avoid unnecessary walking and minimize household chores.     Follow-up:  In clinic Tuesday or Wednesday

## 2019-05-27 NOTE — PROVIDER NOTIFICATION
05/26/19 2025   Provider Notification   Provider Name/Title Dr. Shah   Method of Notification Phone   Request Evaluate - Remote   Notification Reason Status Update     OB updated BPs 100-120/50-70s. Patient has gotten 1L fluid bolus. Contractions have spaced out to every 6-8 minutes, patient states the intensity is the same. Contractions palpate very mild. Order recieved for 20mg dose of Nifedipine PO.

## 2019-05-27 NOTE — PROVIDER NOTIFICATION
05/26/19 2233   Provider Notification   Provider Name/Title Dr. Shah   Method of Notification Phone   Request Evaluate - Remote   Notification Reason Status Update     OB updated contractions have stopped. Category 1 tracing. Order received to discharge patient home. Have patient follow-up Tuesday or Wednesday in clinic and patient needs to take it easy until appointment.

## 2019-05-31 LAB — GROUP B STREP PCR: NEGATIVE

## 2019-06-03 RX ORDER — CITRIC ACID/SODIUM CITRATE 334-500MG
30 SOLUTION, ORAL ORAL
Status: CANCELLED | OUTPATIENT
Start: 2019-06-03

## 2019-06-03 RX ORDER — CEFAZOLIN SODIUM 2 G/100ML
2 INJECTION, SOLUTION INTRAVENOUS
Status: CANCELLED | OUTPATIENT
Start: 2019-06-03

## 2019-06-03 RX ORDER — SODIUM CHLORIDE, SODIUM LACTATE, POTASSIUM CHLORIDE, CALCIUM CHLORIDE 600; 310; 30; 20 MG/100ML; MG/100ML; MG/100ML; MG/100ML
INJECTION, SOLUTION INTRAVENOUS CONTINUOUS
Status: CANCELLED | OUTPATIENT
Start: 2019-06-03

## 2019-06-03 RX ORDER — CEFAZOLIN SODIUM 1 G/3ML
1 INJECTION, POWDER, FOR SOLUTION INTRAMUSCULAR; INTRAVENOUS SEE ADMIN INSTRUCTIONS
Status: CANCELLED | OUTPATIENT
Start: 2019-06-03

## 2019-06-04 ENCOUNTER — ANESTHESIA (OUTPATIENT)
Dept: SURGERY | Facility: CLINIC | Age: 29
End: 2019-06-04
Payer: COMMERCIAL

## 2019-06-04 ENCOUNTER — ANESTHESIA EVENT (OUTPATIENT)
Dept: SURGERY | Facility: CLINIC | Age: 29
End: 2019-06-04
Payer: COMMERCIAL

## 2019-06-04 ENCOUNTER — HOSPITAL ENCOUNTER (INPATIENT)
Facility: CLINIC | Age: 29
LOS: 3 days | Discharge: HOME | End: 2019-06-07
Attending: OBSTETRICS & GYNECOLOGY | Admitting: OBSTETRICS & GYNECOLOGY
Payer: COMMERCIAL

## 2019-06-04 DIAGNOSIS — Z98.891 S/P C-SECTION: Primary | ICD-10-CM

## 2019-06-04 LAB
ABO + RH BLD: NORMAL
ABO + RH BLD: NORMAL
ALBUMIN UR-MCNC: NEGATIVE MG/DL
AMPHETAMINES UR QL SCN: NEGATIVE
APPEARANCE UR: CLEAR
BILIRUB UR QL STRIP: NEGATIVE
BLD GP AB SCN SERPL QL: NORMAL
BLOOD BANK CMNT PATIENT-IMP: NORMAL
CANNABINOIDS UR QL: NEGATIVE
COCAINE UR QL: NEGATIVE
COLOR UR AUTO: YELLOW
FERN CRYSTALLIZATION: NORMAL
GLUCOSE UR STRIP-MCNC: NEGATIVE MG/DL
HGB BLD-MCNC: 11.6 G/DL (ref 11.7–15.7)
HGB UR QL STRIP: NEGATIVE
KETONES UR STRIP-MCNC: NEGATIVE MG/DL
LEUKOCYTE ESTERASE UR QL STRIP: ABNORMAL
MUCOUS THREADS #/AREA URNS LPF: PRESENT /LPF
NITRATE UR QL: NEGATIVE
OPIATES UR QL SCN: NEGATIVE
PCP UR QL SCN: NEGATIVE
PH UR STRIP: 6.5 PH (ref 5–7)
RBC #/AREA URNS AUTO: <1 /HPF (ref 0–2)
RUPTURE OF FETAL MEMBRANES BY ROM PLUS: NEGATIVE
SOURCE: ABNORMAL
SP GR UR STRIP: 1.02 (ref 1–1.03)
SPECIMEN EXP DATE BLD: NORMAL
SQUAMOUS #/AREA URNS AUTO: 4 /HPF (ref 0–1)
UROBILINOGEN UR STRIP-MCNC: NORMAL MG/DL (ref 0–2)
WBC #/AREA URNS AUTO: 1 /HPF (ref 0–5)

## 2019-06-04 PROCEDURE — 25000125 ZZHC RX 250: Performed by: OBSTETRICS & GYNECOLOGY

## 2019-06-04 PROCEDURE — 27210794 ZZH OR GENERAL SUPPLY STERILE: Performed by: OBSTETRICS & GYNECOLOGY

## 2019-06-04 PROCEDURE — 37000009 ZZH ANESTHESIA TECHNICAL FEE, EACH ADDTL 15 MIN: Performed by: OBSTETRICS & GYNECOLOGY

## 2019-06-04 PROCEDURE — 86901 BLOOD TYPING SEROLOGIC RH(D): CPT | Performed by: OBSTETRICS & GYNECOLOGY

## 2019-06-04 PROCEDURE — 86900 BLOOD TYPING SEROLOGIC ABO: CPT | Performed by: OBSTETRICS & GYNECOLOGY

## 2019-06-04 PROCEDURE — 86850 RBC ANTIBODY SCREEN: CPT | Performed by: OBSTETRICS & GYNECOLOGY

## 2019-06-04 PROCEDURE — 36000056 ZZH SURGERY LEVEL 3 1ST 30 MIN: Performed by: OBSTETRICS & GYNECOLOGY

## 2019-06-04 PROCEDURE — 85018 HEMOGLOBIN: CPT | Performed by: OBSTETRICS & GYNECOLOGY

## 2019-06-04 PROCEDURE — 81001 URINALYSIS AUTO W/SCOPE: CPT | Performed by: OBSTETRICS & GYNECOLOGY

## 2019-06-04 PROCEDURE — 25000132 ZZH RX MED GY IP 250 OP 250 PS 637: Performed by: OBSTETRICS & GYNECOLOGY

## 2019-06-04 PROCEDURE — 88302 TISSUE EXAM BY PATHOLOGIST: CPT | Performed by: OBSTETRICS & GYNECOLOGY

## 2019-06-04 PROCEDURE — 59025 FETAL NON-STRESS TEST: CPT

## 2019-06-04 PROCEDURE — 12000000 ZZH R&B MED SURG/OB

## 2019-06-04 PROCEDURE — 25000125 ZZHC RX 250: Performed by: NURSE ANESTHETIST, CERTIFIED REGISTERED

## 2019-06-04 PROCEDURE — 37000008 ZZH ANESTHESIA TECHNICAL FEE, 1ST 30 MIN: Performed by: OBSTETRICS & GYNECOLOGY

## 2019-06-04 PROCEDURE — 25000128 H RX IP 250 OP 636: Performed by: OBSTETRICS & GYNECOLOGY

## 2019-06-04 PROCEDURE — G0463 HOSPITAL OUTPT CLINIC VISIT: HCPCS | Mod: 25

## 2019-06-04 PROCEDURE — 84112 EVAL AMNIOTIC FLUID PROTEIN: CPT | Performed by: OBSTETRICS & GYNECOLOGY

## 2019-06-04 PROCEDURE — 25800030 ZZH RX IP 258 OP 636: Performed by: OBSTETRICS & GYNECOLOGY

## 2019-06-04 PROCEDURE — 0UB70ZZ EXCISION OF BILATERAL FALLOPIAN TUBES, OPEN APPROACH: ICD-10-PCS | Performed by: OBSTETRICS & GYNECOLOGY

## 2019-06-04 PROCEDURE — 25000128 H RX IP 250 OP 636: Performed by: NURSE ANESTHETIST, CERTIFIED REGISTERED

## 2019-06-04 PROCEDURE — 36415 COLL VENOUS BLD VENIPUNCTURE: CPT | Performed by: OBSTETRICS & GYNECOLOGY

## 2019-06-04 PROCEDURE — 88302 TISSUE EXAM BY PATHOLOGIST: CPT | Mod: 26 | Performed by: OBSTETRICS & GYNECOLOGY

## 2019-06-04 PROCEDURE — 25000125 ZZHC RX 250: Performed by: ANESTHESIOLOGY

## 2019-06-04 PROCEDURE — 36000058 ZZH SURGERY LEVEL 3 EA 15 ADDTL MIN: Performed by: OBSTETRICS & GYNECOLOGY

## 2019-06-04 PROCEDURE — 25000128 H RX IP 250 OP 636: Performed by: ANESTHESIOLOGY

## 2019-06-04 PROCEDURE — 80307 DRUG TEST PRSMV CHEM ANLYZR: CPT | Performed by: OBSTETRICS & GYNECOLOGY

## 2019-06-04 PROCEDURE — 86780 TREPONEMA PALLIDUM: CPT | Performed by: OBSTETRICS & GYNECOLOGY

## 2019-06-04 PROCEDURE — 71000014 ZZH RECOVERY PHASE 1 LEVEL 2 FIRST HR: Performed by: OBSTETRICS & GYNECOLOGY

## 2019-06-04 RX ORDER — ONDANSETRON 4 MG/1
4 TABLET, ORALLY DISINTEGRATING ORAL EVERY 30 MIN PRN
Status: CANCELLED | OUTPATIENT
Start: 2019-06-04

## 2019-06-04 RX ORDER — HYDROCORTISONE 2.5 %
CREAM (GRAM) TOPICAL 3 TIMES DAILY PRN
Status: DISCONTINUED | OUTPATIENT
Start: 2019-06-04 | End: 2019-06-07 | Stop reason: HOSPADM

## 2019-06-04 RX ORDER — NALOXONE HYDROCHLORIDE 0.4 MG/ML
.1-.4 INJECTION, SOLUTION INTRAMUSCULAR; INTRAVENOUS; SUBCUTANEOUS
Status: DISCONTINUED | OUTPATIENT
Start: 2019-06-04 | End: 2019-06-07 | Stop reason: HOSPADM

## 2019-06-04 RX ORDER — SIMETHICONE 80 MG
80 TABLET,CHEWABLE ORAL 4 TIMES DAILY PRN
Status: DISCONTINUED | OUTPATIENT
Start: 2019-06-04 | End: 2019-06-07 | Stop reason: HOSPADM

## 2019-06-04 RX ORDER — CEFAZOLIN SODIUM 1 G/3ML
1 INJECTION, POWDER, FOR SOLUTION INTRAMUSCULAR; INTRAVENOUS SEE ADMIN INSTRUCTIONS
Status: DISCONTINUED | OUTPATIENT
Start: 2019-06-04 | End: 2019-06-04 | Stop reason: HOSPADM

## 2019-06-04 RX ORDER — OXYTOCIN 10 [USP'U]/ML
10 INJECTION, SOLUTION INTRAMUSCULAR; INTRAVENOUS
Status: DISCONTINUED | OUTPATIENT
Start: 2019-06-04 | End: 2019-06-07 | Stop reason: HOSPADM

## 2019-06-04 RX ORDER — DEXTROSE, SODIUM CHLORIDE, SODIUM LACTATE, POTASSIUM CHLORIDE, AND CALCIUM CHLORIDE 5; .6; .31; .03; .02 G/100ML; G/100ML; G/100ML; G/100ML; G/100ML
INJECTION, SOLUTION INTRAVENOUS CONTINUOUS
Status: DISCONTINUED | OUTPATIENT
Start: 2019-06-04 | End: 2019-06-05 | Stop reason: CLARIF

## 2019-06-04 RX ORDER — PROMETHAZINE HYDROCHLORIDE 25 MG/ML
25 INJECTION INTRAMUSCULAR; INTRAVENOUS ONCE
Status: COMPLETED | OUTPATIENT
Start: 2019-06-04 | End: 2019-06-04

## 2019-06-04 RX ORDER — IBUPROFEN 800 MG/1
800 TABLET, FILM COATED ORAL EVERY 6 HOURS PRN
Status: DISCONTINUED | OUTPATIENT
Start: 2019-06-05 | End: 2019-06-07 | Stop reason: HOSPADM

## 2019-06-04 RX ORDER — AMOXICILLIN 250 MG
2 CAPSULE ORAL 2 TIMES DAILY PRN
Status: DISCONTINUED | OUTPATIENT
Start: 2019-06-04 | End: 2019-06-07 | Stop reason: HOSPADM

## 2019-06-04 RX ORDER — BISACODYL 10 MG
10 SUPPOSITORY, RECTAL RECTAL DAILY PRN
Status: DISCONTINUED | OUTPATIENT
Start: 2019-06-06 | End: 2019-06-07 | Stop reason: HOSPADM

## 2019-06-04 RX ORDER — KETOROLAC TROMETHAMINE 30 MG/ML
30 INJECTION, SOLUTION INTRAMUSCULAR; INTRAVENOUS EVERY 6 HOURS
Status: COMPLETED | OUTPATIENT
Start: 2019-06-04 | End: 2019-06-05

## 2019-06-04 RX ORDER — LANOLIN 100 %
OINTMENT (GRAM) TOPICAL
Status: DISCONTINUED | OUTPATIENT
Start: 2019-06-04 | End: 2019-06-07 | Stop reason: HOSPADM

## 2019-06-04 RX ORDER — PHENYLEPHRINE HYDROCHLORIDE 10 MG/ML
INJECTION INTRAVENOUS PRN
Status: DISCONTINUED | OUTPATIENT
Start: 2019-06-04 | End: 2019-06-04

## 2019-06-04 RX ORDER — SODIUM CHLORIDE, SODIUM LACTATE, POTASSIUM CHLORIDE, CALCIUM CHLORIDE 600; 310; 30; 20 MG/100ML; MG/100ML; MG/100ML; MG/100ML
INJECTION, SOLUTION INTRAVENOUS CONTINUOUS
Status: DISCONTINUED | OUTPATIENT
Start: 2019-06-04 | End: 2019-06-04 | Stop reason: HOSPADM

## 2019-06-04 RX ORDER — OXYTOCIN/0.9 % SODIUM CHLORIDE 30/500 ML
340 PLASTIC BAG, INJECTION (ML) INTRAVENOUS CONTINUOUS PRN
Status: DISCONTINUED | OUTPATIENT
Start: 2019-06-04 | End: 2019-06-07 | Stop reason: HOSPADM

## 2019-06-04 RX ORDER — NALOXONE HYDROCHLORIDE 0.4 MG/ML
.1-.4 INJECTION, SOLUTION INTRAMUSCULAR; INTRAVENOUS; SUBCUTANEOUS
Status: CANCELLED | OUTPATIENT
Start: 2019-06-04 | End: 2019-06-05

## 2019-06-04 RX ORDER — OXYTOCIN/0.9 % SODIUM CHLORIDE 30/500 ML
100 PLASTIC BAG, INJECTION (ML) INTRAVENOUS CONTINUOUS
Status: DISCONTINUED | OUTPATIENT
Start: 2019-06-04 | End: 2019-06-05 | Stop reason: CLARIF

## 2019-06-04 RX ORDER — ONDANSETRON 2 MG/ML
INJECTION INTRAMUSCULAR; INTRAVENOUS PRN
Status: DISCONTINUED | OUTPATIENT
Start: 2019-06-04 | End: 2019-06-04

## 2019-06-04 RX ORDER — ACETAMINOPHEN 325 MG/1
975 TABLET ORAL ONCE
Status: COMPLETED | OUTPATIENT
Start: 2019-06-04 | End: 2019-06-04

## 2019-06-04 RX ORDER — ACETAMINOPHEN 325 MG/1
975 TABLET ORAL EVERY 8 HOURS
Status: DISCONTINUED | OUTPATIENT
Start: 2019-06-04 | End: 2019-06-07 | Stop reason: HOSPADM

## 2019-06-04 RX ORDER — SODIUM CHLORIDE, SODIUM LACTATE, POTASSIUM CHLORIDE, CALCIUM CHLORIDE 600; 310; 30; 20 MG/100ML; MG/100ML; MG/100ML; MG/100ML
INJECTION, SOLUTION INTRAVENOUS CONTINUOUS
Status: DISCONTINUED | OUTPATIENT
Start: 2019-06-04 | End: 2019-06-04

## 2019-06-04 RX ORDER — OXYCODONE HYDROCHLORIDE 5 MG/1
5-10 TABLET ORAL
Status: DISCONTINUED | OUTPATIENT
Start: 2019-06-04 | End: 2019-06-07 | Stop reason: HOSPADM

## 2019-06-04 RX ORDER — ONDANSETRON 2 MG/ML
4 INJECTION INTRAMUSCULAR; INTRAVENOUS EVERY 6 HOURS PRN
Status: DISCONTINUED | OUTPATIENT
Start: 2019-06-04 | End: 2019-06-07 | Stop reason: HOSPADM

## 2019-06-04 RX ORDER — LIDOCAINE 40 MG/G
CREAM TOPICAL
Status: DISCONTINUED | OUTPATIENT
Start: 2019-06-04 | End: 2019-06-07 | Stop reason: HOSPADM

## 2019-06-04 RX ORDER — ONDANSETRON 2 MG/ML
4 INJECTION INTRAMUSCULAR; INTRAVENOUS EVERY 30 MIN PRN
Status: CANCELLED | OUTPATIENT
Start: 2019-06-04

## 2019-06-04 RX ORDER — SODIUM CHLORIDE, SODIUM LACTATE, POTASSIUM CHLORIDE, CALCIUM CHLORIDE 600; 310; 30; 20 MG/100ML; MG/100ML; MG/100ML; MG/100ML
INJECTION, SOLUTION INTRAVENOUS CONTINUOUS
Status: CANCELLED | OUTPATIENT
Start: 2019-06-04

## 2019-06-04 RX ORDER — CEFAZOLIN SODIUM 2 G/100ML
2 INJECTION, SOLUTION INTRAVENOUS
Status: DISCONTINUED | OUTPATIENT
Start: 2019-06-04 | End: 2019-06-04 | Stop reason: HOSPADM

## 2019-06-04 RX ORDER — EPHEDRINE SULFATE 50 MG/ML
25 INJECTION, SOLUTION INTRAVENOUS ONCE
Status: COMPLETED | OUTPATIENT
Start: 2019-06-04 | End: 2019-06-04

## 2019-06-04 RX ORDER — AMOXICILLIN 250 MG
1 CAPSULE ORAL 2 TIMES DAILY PRN
Status: DISCONTINUED | OUTPATIENT
Start: 2019-06-04 | End: 2019-06-07 | Stop reason: HOSPADM

## 2019-06-04 RX ORDER — ONDANSETRON 2 MG/ML
4 INJECTION INTRAMUSCULAR; INTRAVENOUS EVERY 6 HOURS PRN
Status: DISCONTINUED | OUTPATIENT
Start: 2019-06-04 | End: 2019-06-04

## 2019-06-04 RX ORDER — FENTANYL CITRATE 50 UG/ML
25-50 INJECTION, SOLUTION INTRAMUSCULAR; INTRAVENOUS
Status: CANCELLED | OUTPATIENT
Start: 2019-06-04

## 2019-06-04 RX ORDER — OXYTOCIN/0.9 % SODIUM CHLORIDE 30/500 ML
PLASTIC BAG, INJECTION (ML) INTRAVENOUS PRN
Status: DISCONTINUED | OUTPATIENT
Start: 2019-06-04 | End: 2019-06-04

## 2019-06-04 RX ORDER — CITRIC ACID/SODIUM CITRATE 334-500MG
30 SOLUTION, ORAL ORAL
Status: COMPLETED | OUTPATIENT
Start: 2019-06-04 | End: 2019-06-04

## 2019-06-04 RX ORDER — MAGNESIUM HYDROXIDE 1200 MG/15ML
LIQUID ORAL PRN
Status: DISCONTINUED | OUTPATIENT
Start: 2019-06-04 | End: 2019-06-07 | Stop reason: HOSPADM

## 2019-06-04 RX ORDER — EPHEDRINE SULFATE 50 MG/ML
INJECTION, SOLUTION INTRAVENOUS PRN
Status: DISCONTINUED | OUTPATIENT
Start: 2019-06-04 | End: 2019-06-04

## 2019-06-04 RX ORDER — CEFAZOLIN SODIUM 2 G/100ML
INJECTION, SOLUTION INTRAVENOUS PRN
Status: DISCONTINUED | OUTPATIENT
Start: 2019-06-04 | End: 2019-06-04

## 2019-06-04 RX ORDER — ACETAMINOPHEN 325 MG/1
650 TABLET ORAL EVERY 4 HOURS PRN
Status: DISCONTINUED | OUTPATIENT
Start: 2019-06-07 | End: 2019-06-07 | Stop reason: HOSPADM

## 2019-06-04 RX ADMIN — ONDANSETRON 4 MG: 2 INJECTION INTRAMUSCULAR; INTRAVENOUS at 16:23

## 2019-06-04 RX ADMIN — SODIUM CHLORIDE, POTASSIUM CHLORIDE, SODIUM LACTATE AND CALCIUM CHLORIDE 1000 ML: 600; 310; 30; 20 INJECTION, SOLUTION INTRAVENOUS at 10:40

## 2019-06-04 RX ADMIN — ONDANSETRON 4 MG: 2 INJECTION INTRAMUSCULAR; INTRAVENOUS at 21:14

## 2019-06-04 RX ADMIN — OXYTOCIN-SODIUM CHLORIDE 0.9% IV SOLN 30 UNIT/500ML 1 ML: 30-0.9/5 SOLUTION at 16:21

## 2019-06-04 RX ADMIN — CEFAZOLIN SODIUM 2 G: 2 INJECTION, SOLUTION INTRAVENOUS at 16:01

## 2019-06-04 RX ADMIN — SODIUM CHLORIDE, POTASSIUM CHLORIDE, SODIUM LACTATE AND CALCIUM CHLORIDE: 600; 310; 30; 20 INJECTION, SOLUTION INTRAVENOUS at 11:37

## 2019-06-04 RX ADMIN — PHENYLEPHRINE HYDROCHLORIDE 150 MCG: 10 INJECTION INTRAVENOUS at 16:22

## 2019-06-04 RX ADMIN — KETOROLAC TROMETHAMINE 30 MG: 30 INJECTION, SOLUTION INTRAMUSCULAR at 18:15

## 2019-06-04 RX ADMIN — ACETAMINOPHEN 975 MG: 325 TABLET, FILM COATED ORAL at 15:22

## 2019-06-04 RX ADMIN — EPHEDRINE SULFATE 25 MG: 50 INJECTION, SOLUTION INTRAVENOUS at 23:02

## 2019-06-04 RX ADMIN — EPHEDRINE SULFATE 10 MG: 50 INJECTION, SOLUTION INTRAVENOUS at 15:51

## 2019-06-04 RX ADMIN — OXYTOCIN-SODIUM CHLORIDE 0.9% IV SOLN 30 UNIT/500ML 100 ML/HR: 30-0.9/5 SOLUTION at 19:11

## 2019-06-04 RX ADMIN — OXYTOCIN-SODIUM CHLORIDE 0.9% IV SOLN 30 UNIT/500ML 249 ML: 30-0.9/5 SOLUTION at 16:59

## 2019-06-04 RX ADMIN — PHENYLEPHRINE HYDROCHLORIDE 100 MCG: 10 INJECTION INTRAVENOUS at 15:58

## 2019-06-04 RX ADMIN — PHENYLEPHRINE HYDROCHLORIDE 200 MCG: 10 INJECTION INTRAVENOUS at 16:16

## 2019-06-04 RX ADMIN — PROMETHAZINE HYDROCHLORIDE 25 MG: 25 INJECTION INTRAMUSCULAR; INTRAVENOUS at 23:02

## 2019-06-04 RX ADMIN — SODIUM CHLORIDE 500 MG: 9 INJECTION, SOLUTION INTRAVENOUS at 15:22

## 2019-06-04 RX ADMIN — SODIUM CITRATE AND CITRIC ACID MONOHYDRATE 30 ML: 500; 334 SOLUTION ORAL at 15:23

## 2019-06-04 RX ADMIN — PHENYLEPHRINE HYDROCHLORIDE 100 MCG: 10 INJECTION INTRAVENOUS at 15:51

## 2019-06-04 RX ADMIN — SODIUM CHLORIDE, POTASSIUM CHLORIDE, SODIUM LACTATE AND CALCIUM CHLORIDE: 600; 310; 30; 20 INJECTION, SOLUTION INTRAVENOUS at 15:48

## 2019-06-04 RX ADMIN — PHENYLEPHRINE HYDROCHLORIDE 100 MCG: 10 INJECTION INTRAVENOUS at 16:11

## 2019-06-04 ASSESSMENT — MIFFLIN-ST. JEOR: SCORE: 1597.71

## 2019-06-04 NOTE — PROVIDER NOTIFICATION
06/04/19 1055   Provider Notification   Provider Name/Title Dr Farmer    Method of Notification Phone   Request Evaluate - Remote   Notification Reason Status Update;SVE   Dr Lipscmob updated with unchanged SVE, UA results and about the patients cough and wheezing.  Said to recheck the cervix 2 hour from the last check and continue to monitor.

## 2019-06-04 NOTE — ANESTHESIA PREPROCEDURE EVALUATION
Anesthesia Pre-Procedure Evaluation    Patient: Laura Song   MRN: [id number] : 1990          Preoperative Diagnosis: Repeat    Procedure(s):   SECTION    Past Medical History:   Diagnosis Date     Depressive disorder     anxiety/depression     History of blood transfusion     with 1st delivery     Past Surgical History:   Procedure Laterality Date     ABDOMEN SURGERY      c/section x2()     ENT SURGERY      tonsillectomy     LAPAROSCOPY      for endometriosis     Anesthesia Evaluation     . Pt has had prior anesthetic. Type: General    No history of anesthetic complications          ROS/MED HX    ENT/Pulmonary:  - neg pulmonary ROS     Neurologic:  - neg neurologic ROS     Cardiovascular:  - neg cardiovascular ROS       METS/Exercise Tolerance:     Hematologic:  - neg hematologic  ROS       Musculoskeletal:  - neg musculoskeletal ROS       GI/Hepatic:  - neg GI/hepatic ROS       Renal/Genitourinary:  - ROS Renal section negative       Endo:  - neg endo ROS       Psychiatric:  - neg psychiatric ROS       Infectious Disease:  - neg infectious disease ROS       Malignancy:         Other:    (+) Possibly pregnant                         Physical Exam  Normal systems: cardiovascular, pulmonary and dental    Airway   Mallampati: II  TM distance: >3 FB  Neck ROM: full    Dental     Cardiovascular       Pulmonary             Lab Results   Component Value Date    WBC 8.5 2019    HGB 11.6 (L) 2019    HCT 33.6 (L) 2019     2019     2019    POTASSIUM 3.6 2019    CHLORIDE 109 2019    CO2 21 2019    BUN 6 (L) 2019    CR 0.65 2019    GLC 88 2019    LUIS 8.6 2019    ALBUMIN 3.8 2018    PROTTOTAL 7.1 2018    ALT 17 2019    AST 19 2019    ALKPHOS 65 2018    BILITOTAL 1.3 2018    LIPASE 108 2018       Preop Vitals  BP Readings from Last 3 Encounters:   19 125/83   19  "124/72   05/21/19 133/80    Pulse Readings from Last 3 Encounters:   06/04/19 84   05/26/19 81   05/21/19 82      Resp Readings from Last 3 Encounters:   06/04/19 18   05/26/19 18   05/21/19 18    SpO2 Readings from Last 3 Encounters:   06/18/18 100%   02/21/16 98%      Temp Readings from Last 1 Encounters:   06/04/19 97.5  F (36.4  C) (Oral)    Ht Readings from Last 1 Encounters:   06/04/19 1.651 m (5' 5\")      Wt Readings from Last 1 Encounters:   06/04/19 86.2 kg (190 lb)    Estimated body mass index is 31.62 kg/m  as calculated from the following:    Height as of this encounter: 1.651 m (5' 5\").    Weight as of this encounter: 86.2 kg (190 lb).       Anesthesia Plan      History & Physical Review  History and physical reviewed and following examination; no interval change.    ASA Status:  2 emergent.    NPO Status:  > 8 hours    Plan for Spinal with Intravenous induction. Maintenance will be Balanced.    PONV prophylaxis:  Dexamethasone or Solumedrol and Ondansetron (or other 5HT-3)       Postoperative Care  Postoperative pain management:  IV analgesics, Oral pain medications, Neuraxial analgesia and Multi-modal analgesia.      Consents  Anesthetic plan, risks, benefits and alternatives discussed with:  Patient..                 Avinash Del Rosario MD                    .  "

## 2019-06-04 NOTE — OP NOTE
[facility]  Obstetrics Service Operative Report    Surgery Date:  2019  Surgeon(s): Moo Moreau  Assistants: Lorena Lipscomb (Dr Solo was medically needed due to the complicated case given her hx of 2 previous CS)    Preoperative Diagnoses:  1.  Intrauterine pregnancy at 35w4d  2.  History of  section x2  3. Desires permanent sterilization    Postoperative diagnoses: Same     Procedure performed:    1. Repeat low segment transverse  section via pfann incision with 2 layer uterine closure    2. Bilateral salpingectomy    Anesthesia:  Spinal with duramorph  Est Blood Loss (mL): 500 mL QBL 85cc     Specimens: cord blood and gasses, bilateral fallopian tubes  Complications: None      Operative findings: Single viable male infant at 1619 hours on 19 . Apgars of 6,7 and 8 at one and five and ten minutes.  Birth weight (GM): 3200 GM.  Fetal presentation: vtx.  Position: bettina  Amniotic fluid: clear.  Arterial Cord pH pending.  Placenta intact with 3 vessel cord.   Normal appearing uterus, fallopian tubes, ovaries.  No intraabdominal adhesions.  No abdominal wall adhesions      Indication: Ms. Song  is a 28 year old  @ 35w4d by LMP c/w 1st trimester U/S who presented to L&D for contractions.  Stated contractions were q15-20 min but had increased to q 5 min on arrival.  At first on L&D, uterus was irritable with ctx.  Patient received IV fluids and irritability decreased but ctx increased to q2-4 min and also became more painful.  No VB.  After first exam, patient did have small amount of LOF, but ROM+ and fern were negative.  No further leaking was noted.  Cervix was very difficult to examine due to discomfort - 60/-4.  No change was noted but given increasing ctx and pain, decision was made to proceed with . The risks, benefits, and alternatives of  delivery were explained and the patient agreed to proceed.        Prenatal course:  1st visit  at 7 weeks, regular care, TWG 34 lbs.     Pregnancy complications:  1. H/O c/s x 2 - plans repeat and tubal ligation  2. H/O HSV - no outbreak  3. H/O PPROM at 36+ weeks  4. S/P Tdap and flu shot     Prenatal labs:  O+, antibody screen negative,  Rubella  Immune, Hep B/HIV/RPR all negative, GC/CT negative, , 3 hr GTT 81/120/94/82,  GBS negative; genetic screening tests declines     Procedure details:  After obtaining informed consent, the patient was taken to the operating room. She received adequate IV antibiotcs prior to the skin incision. She was placed in the dorsal supine position with a leftward tilt and prepped and draped in the usual sterile fashion. Following test of adequate spinal anesthesia, the abdomen was entered through a through her previous scar. The skin incision was made sharply and carried through the subcutaneous tissue to the fascia.  Fascia was incised in the midline and extended laterally with the Stewart scissors.  The superior margin of the fascial incision was grasped with Kocher clamps and dissected from the underlying muscle sharp and blunt dissecton, which was then repeated at the lower margin of the fascial incision.  The muscle was  in the midline.  The peritoneum was entered bluntly and the opening extended by digital dissection with care to avoid the bladder. A bladder blade was placed. The lower segment of the uterus was opened sharply in a transverse fashion and extended with digital pressure. The infant's head was elevated to the level of the hysterotomy and was delivered atraumatically. The cord was doubly clamped and cut and the infant was handed off to the waiting [facility] staff. A segment of the cord was cut and set aside for cord gases if needed. The placenta was manually extracted.  The uterus was exteriorized from the abdomen and cleared of all clots and debris.  The uterus was massaged and was noted to be firm.  Oxytocin was given through the running IV.  With  vigorous massage as well as administration of oxytocin, good uterine tone was achieved. The hysterotomy was repaired with 0-vicryl suture in a running locked fashion. A 2nd layer of 0-monocryl was used to imbricate the incision and good hemostasis was achieved. The bladder flap was inspected and found to be hemostatic.  The fallopian tubes were isolated, grasped and dissected using the ligasure devise to cauterize and cut the mesosalpinx.  The hysterotomy was again inspected and found to have no active sites of bleeding.  The abdominal wall was examined and found to have no active sites of bleeding.  The peritoneum was closed with running 3-0 vicryl.  The fascia was closed with a running suture of 0-vicryl.  Subcutaneous tissue was irrigated. Areas that were oozing were controlled with cautery. The subcutaneous tissue was reapproximated with 3-0 vicryl interrupted sutures. The skin was closed with 4-0 vicryl. The patient tolerated the procedure well and was taken to the recovery room in stable condition. All sponge, needle and instrument counts were correct x2.     Moo Moreau DO  June 4, 2019  [phone]

## 2019-06-04 NOTE — H&P
Laura Song  [id number]  OB Admit History & Physical      HPI:  Ms. Song  is a 28 year old  @ 35w4d by LMP c/w 1st trimester U/S who presented to L&D for contractions.  Stated contractions were q15-20 min but had increased to q 5 min on arrival.  At first on L&D, uterus was irritable with ctx.  Patient received IV fluids and irritability decreased but ctx increased to q2-4 min and also became more painful.  No VB.  After first exam, patient did have small amount of LOF, but ROM+ and fern were negative.  No further leaking was noted.  Cervix was very difficult to examine due to discomfort - /-4.  No change was noted but given increasing ctx and pain, decision was made to proceed with .      Prenatal course:  1st visit at 7 weeks, regular care, TWG 34 lbs.    Pregnancy complications:  1. H/O c/s x 2 - plans repeat and tubal ligation  2. H/O HSV - no outbreak  3. H/O PPROM at 36+ weeks  4. S/P Tdap and flu shot    Prenatal labs:  O+, antibody screen negative,  Rubella  Immune, Hep B/HIV/RPR all negative, GC/CT negative, , 3 hr GTT 81/120/94/82,  GBS negative; genetic screening tests declines     OB history:   OB History    Para Term  AB Living   3 2 1 1 0 2   SAB TAB Ectopic Multiple Live Births   0 0 0 0 0      # Outcome Date GA Lbr [place]arro/2nd Weight Sex Delivery Anes PTL Lv   3 Current            2             1 Term                  PMHx:     Past Medical History:   Diagnosis Date     Depressive disorder     anxiety/depression     History of blood transfusion     with 1st delivery       PSHX:    Past Surgical History:   Procedure Laterality Date     ABDOMEN SURGERY      c/section x2(/)     ENT SURGERY      tonsillectomy     LAPAROSCOPY      for endometriosis       Meds:    Flexeril PRN, PNV       Allergies: Reglan [metoclopramide]      REVIEW OF SYSTEMS:  Positives and negatives in HPI.     SocHx:    Social History     Socioeconomic History     Marital  "status:      Spouse name: Not on file     Number of children: Not on file     Years of education: Not on file     Highest education level: Not on file   Occupational History     Not on file   Social Needs     Financial resource strain: Not on file     Food insecurity:     Worry: Not on file     Inability: Not on file     Transportation needs:     Medical: Not on file     Non-medical: Not on file   Tobacco Use     Smoking status: Never Smoker     Smokeless tobacco: Never Used   Substance and Sexual Activity     Alcohol use: Not Currently     Drug use: No     Sexual activity: Yes   Lifestyle     Physical activity:     Days per week: Not on file     Minutes per session: Not on file     Stress: Not on file   Relationships     Social connections:     Talks on phone: Not on file     Gets together: Not on file     Attends [employer] service: Not on file     Active member of club or organization: Not on file     Attends meetings of clubs or organizations: Not on file     Relationship status: Not on file     Intimate partner violence:     Fear of current or ex partner: Not on file     Emotionally abused: Not on file     Physically abused: Not on file     Forced sexual activity: Not on file   Other Topics Concern     Not on file   Social History Narrative     Not on file        Fam Hx:  No family history on file.      PHYSICAL EXAM:      Vitals:  /83   Pulse 84   Temp 98.1  F (36.7  C) (Oral)   Resp 18   Ht 1.651 m (5' 5\")   Wt 86.2 kg (190 lb)   BMI 31.62 kg/m    Alert Awake in NAD  ABD gravid, non-tender  Cervix:  1 cm / 60 % effaced at -4 station, membranes intact (small LOF but neg evaluation with fern and ROM+)  EFM:  Baseline 150, with moderate variability, accels present, no decels  [place]: contractions q2-3 min    Assessment:  IUP at 35w4d admitted for early labor, h/o previous  x 2. Desires permanent sterilization.     Plan:  Long discussion with patient re: contractions increasing in " frequency and intensity.  Patient now very uncomfortable with each ctx - needs to breathe through.  Cervix has not showed further change, but given increasing ctx, early labor occurring and recommend proceeding with delivery.  Discussed risks of prematurity and possible need for NICU admission.  Discussed risks of not proceeding with delivery including uterine rupture.  Patient desires to proceed with  today.   Will have NNP at delivery.  Discussed risks and benefits of   including risks of bleeding, possible need for blood transfusion, infection and damage to adjacent organs including bowel, bladder and ureters.  Discussed bilateral salpingectomy for permanent sterilization.  Discussed risks of regret and failure resulting in pregnancy with increased risk of ectopic pregnancy.  Consent signed.  Dr. Moreau will be present for .        Lorena Lipscomb MD  Dept of OB/GYN  2019

## 2019-06-04 NOTE — PROVIDER NOTIFICATION
06/04/19 1315   Provider Notification   Provider Name/Title Dr Lipscomb   Method of Notification Phone   Request Evaluate - Remote   Notification Reason Lab/Diagnostic Study;Status Update   Dr Lipscomb update with the negative Rom plus results, contractions, and pain. Orders for Ferns received.

## 2019-06-04 NOTE — ANESTHESIA POSTPROCEDURE EVALUATION
Patient: Laura Song    Procedure(s):   SECTION    Diagnosis:Repeat  Diagnosis Additional Information: No value filed.    Anesthesia Type:  No value filed.    Note:  Anesthesia Post Evaluation    Patient location during evaluation: PACU  Patient participation: Able to fully participate in evaluation  Level of consciousness: awake and alert  Pain management: adequate  Airway patency: patent  Cardiovascular status: acceptable  Respiratory status: acceptable  Hydration status: acceptable  PONV: none     Anesthetic complications: None          Last vitals:  Vitals:    19 0947 19 1440 19 1715   BP: 132/89 125/83    Pulse: 84     Resp: 18 18 18   Temp: 98.3  F (36.8  C) 98.1  F (36.7  C) 97.5  F (36.4  C)         Electronically Signed By: Avinash Del Rosario MD  2019  5:25 PM

## 2019-06-04 NOTE — ANESTHESIA PROCEDURE NOTES
Peripheral nerve/Neuraxial procedure note : intrathecal  Pre-Procedure  Performed by Avinash Del Rosario MD  [facility]  Location: OR      Pre-Anesthestic Checklist: patient identified, IV checked, risks and benefits discussed, informed consent, monitors and equipment checked and pre-op evaluation    Timeout  Correct Patient: Yes   Correct Procedure: Yes   Correct Site: Yes   Correct Laterality: N/A   Correct Position: Yes   Site Marked: N/A   .   Procedure Documentation    .    Procedure:    Intrathecal.  Insertion Site:L3-4  (midline approach)      Patient Prep;mask, povidone-iodine 7.5% surgical scrub, patient draped.  .  Needle: Sprotte Spinal Needle (gauge): 24  Spinal/LP Needle Length (inches): 3.5 # of attempts: 1 and # of redirects:  Introducer used .       Assessment/Narrative  Paresthesias: No.  .  .  clear CSF fluid removed .

## 2019-06-04 NOTE — PROVIDER NOTIFICATION
06/04/19 1005   Provider Notification   Provider Name/Title Dr Lipscomb   Method of Notification Phone   Request Evaluate - Remote   Notification Reason Patient Arrived;Status Update;Labor Status   Orders received for IV flush, SVE, UA and monitoring.

## 2019-06-04 NOTE — PLAN OF CARE
Data: Patient presented to [facility] at 2019  9:38 AM.  Reason for maternal/fetal assessment is uterine contractions, abdominal pain. Patient reports contractions since last night.  Patient is a .  Prenatal record reviewed. Pregnancy has been  has been complicated by HX of  delivery for her 2nd child and first two deliveries were C Sections, patient had been in last week for contractions and was given a dose of Nifedipine. Patient rates the pain with her contractions at a 5/10 and is breathing through them.   Gestational Age 35w4d. VSS. Fetal movement present. Patient denies leaking of vaginal fluid/rupture of membranes, vaginal bleeding, pelvic pressure, nausea, vomiting, headache, visual disturbances, epigastric or URQ pain, significant edema. Support person is not present.   Action: Verbal consent for EFM. Triage assessment completed. Bill of rights reviewed.  Response: Patient verbalized agreement with plan. Will contact Dr Lorena Lipscomb with update and further orders.

## 2019-06-04 NOTE — PROVIDER NOTIFICATION
06/04/19 1211   Provider Notification   Provider Name/Title Dr Lipscomb    Method of Notification Electronic Page   Request Evaluate - Remote   Notification Reason Status Update;Membrane Status   updated dr Lipscomb with the patient's hearing a pop and then started leaking clear fluid. Observed a small amount of clear fluid. Orders received for ROM plus

## 2019-06-04 NOTE — PROVIDER NOTIFICATION
06/04/19 1207   Provider Notification   Provider Name/Title Dr Lipscomb   Method of Notification Phone   Request Evaluate - Remote   Notification Reason SVE;Status Update   updated Dr Lipscomb with unchanged SVE , but the contractions have increased in intensity, and definition, Dr said she will come and see the patient.

## 2019-06-04 NOTE — ANESTHESIA CARE TRANSFER NOTE
Patient: Laura Song    Procedure(s):   SECTION    Diagnosis: Repeat  Diagnosis Additional Information: No value filed.    Anesthesia Type:   No value filed.     Note:  Airway :Room Air  Patient transferred to:Labor and Delivery  Comments: VSS.Handoff Report: Identifed the Patient, Identified the Reponsible Provider, Reviewed the pertinent medical history, Discussed the surgical course, Reviewed Intra-OP anesthesia mangement and issues during anesthesia, Set expectations for post-procedure period and Allowed opportunity for questions and acknowledgement of understanding      Vitals: (Last set prior to Anesthesia Care Transfer)    CRNA VITALS  2019 1632 - 2019 1715      2019             NIBP:  112/53    Pulse:  69    NIBP Mean:  71    SpO2:  95 %                Electronically Signed By: LORENZA Hampton CRNA  2019  5:15 PM

## 2019-06-05 LAB
HGB BLD-MCNC: 10.7 G/DL (ref 11.7–15.7)
T PALLIDUM AB SER QL: NONREACTIVE

## 2019-06-05 PROCEDURE — 36415 COLL VENOUS BLD VENIPUNCTURE: CPT | Performed by: OBSTETRICS & GYNECOLOGY

## 2019-06-05 PROCEDURE — 12000000 ZZH R&B MED SURG/OB

## 2019-06-05 PROCEDURE — 25000128 H RX IP 250 OP 636: Performed by: OBSTETRICS & GYNECOLOGY

## 2019-06-05 PROCEDURE — 25000132 ZZH RX MED GY IP 250 OP 250 PS 637: Performed by: OBSTETRICS & GYNECOLOGY

## 2019-06-05 PROCEDURE — 85018 HEMOGLOBIN: CPT | Performed by: OBSTETRICS & GYNECOLOGY

## 2019-06-05 RX ADMIN — OXYCODONE HYDROCHLORIDE 10 MG: 5 TABLET ORAL at 15:03

## 2019-06-05 RX ADMIN — IBUPROFEN 800 MG: 800 TABLET ORAL at 19:41

## 2019-06-05 RX ADMIN — SODIUM CHLORIDE, SODIUM LACTATE, POTASSIUM CHLORIDE, CALCIUM CHLORIDE AND DEXTROSE MONOHYDRATE: 5; 600; 310; 30; 20 INJECTION, SOLUTION INTRAVENOUS at 00:27

## 2019-06-05 RX ADMIN — KETOROLAC TROMETHAMINE 15 MG: 30 INJECTION, SOLUTION INTRAMUSCULAR at 11:58

## 2019-06-05 RX ADMIN — SENNOSIDES AND DOCUSATE SODIUM 2 TABLET: 8.6; 5 TABLET ORAL at 19:41

## 2019-06-05 RX ADMIN — ACETAMINOPHEN 975 MG: 325 TABLET, FILM COATED ORAL at 00:15

## 2019-06-05 RX ADMIN — KETOROLAC TROMETHAMINE 30 MG: 30 INJECTION, SOLUTION INTRAMUSCULAR at 00:15

## 2019-06-05 RX ADMIN — KETOROLAC TROMETHAMINE 30 MG: 30 INJECTION, SOLUTION INTRAMUSCULAR at 06:08

## 2019-06-05 RX ADMIN — OXYCODONE HYDROCHLORIDE 5 MG: 5 TABLET ORAL at 12:09

## 2019-06-05 RX ADMIN — OXYCODONE HYDROCHLORIDE 10 MG: 5 TABLET ORAL at 19:41

## 2019-06-05 RX ADMIN — SENNOSIDES AND DOCUSATE SODIUM 1 TABLET: 8.6; 5 TABLET ORAL at 08:09

## 2019-06-05 RX ADMIN — OXYCODONE HYDROCHLORIDE 10 MG: 5 TABLET ORAL at 23:44

## 2019-06-05 RX ADMIN — ACETAMINOPHEN 975 MG: 325 TABLET, FILM COATED ORAL at 17:01

## 2019-06-05 RX ADMIN — ACETAMINOPHEN 975 MG: 325 TABLET, FILM COATED ORAL at 08:08

## 2019-06-05 NOTE — PLAN OF CARE
Pt states she is comfortable at this time.  States she no longer has N/V; tolerating clear liquids and BS X 4 quads are audible and active.  IV now infusing maintenance fluid @ 125 ml/hr.  Urine in holland is starting to become more clear and is draining a small amt.  Able to watch  in NICU on iPad.  Pt able to move herself around in bed; pt requesting to get up to WC to visit  in NICU, after she pumps this shift. Plan made w/ pt allow her to rest a little before pumping and ambulating.  No support person present this shift.

## 2019-06-05 NOTE — PLAN OF CARE
Data: Laura Song transferred to 425 via cart at 1940 after visiting  in NICU.   Action: Receiving unit notified of transfer: Yes. Patient and family notified of room change. Report given to TYRON Olmedo at . Belongings sent to receiving unit. Accompanied by Registered Nurse. Oriented patient to surroundings. Call light within reach.   Response: Patient tolerated transfer and is stable.    Patients mobililty level scored using the bedside mobility assistance tool (BMAT). Patient is at a mobility level test number: 1. Mobility equipment used: slider board. Required assist of 3 staff members. Further use of BMAT scoring required.

## 2019-06-05 NOTE — PLAN OF CARE
UAL. VSS. Has been up to the bathroom and voided a total of 1150 ml this shift so far. Walked to NICU this morning with her . Encouraged her to take a wheelchair at least part way and increase activity as tolerated. Incisional pain up to a 5 - oxycodone given and it decreased to a 3. Spent much of morning in  NICU and states baby is doing well. Using electric breast pump and obtaining small amounts of colostrum. Meeting expected outcomes.

## 2019-06-05 NOTE — LACTATION NOTE
This note was copied from a baby's chart.  LC introduced to Laura.  Folder given for lactation support with handouts for collecting breast milk,  cleaning breast pump supplies breast milk storage and cleaning breast pump supplies  Feeding: Ana is on RA after extubation during night. Continues with OG. Placed STS and shows no cues for feeding. Reviewed with parents feeding expectations and supported STS as much as possible.  Pumping: Focused on pumping q 3 hr. Reviewed expectations for milk production. Laura breast fed 2 siblings, but reports milk supply is usually low as siblings received supplements.  Plan: Continue to follow and support            Monitor pumping due to hx of lower milk volume

## 2019-06-05 NOTE — LACTATION NOTE
This note was copied from a baby's chart.  LC assisting with breast feeding  Feeding: Ana is latched with 24mm nipple shield (per my recommendation). Drowsy but has been sucking per mother's report. Gtts MBM given and strong suck initiated. Continued sucking for several minutes. Suggested if babe continues to have strong suck, attempt to latch without nipple shield.  Pumping: Laura is getting appropriate amt colostrum. Reviewed pumping strategies.  Plan: Continue to follow and support            Monitor pumping to aid in approp milk volume

## 2019-06-05 NOTE — PLAN OF CARE
Transfer from L&D per cart after visiting baby in NICU. Oriented to room and surroundings, denies pain at this time. FOB present and supportive.

## 2019-06-05 NOTE — PROGRESS NOTES
"OB Post-op  Section Progress Note POD# 1    S:  Patient doing well.  Pain is well controlled with the ordered pain medication.  Ambulating.  Tolerating full liquid diet.  No N/V.   Voiding.  Bleeding is normal.  Breastfeeding.  Baby doing well in the NICU for observation.     O:  /73   Pulse 89   Temp 98.3  F (36.8  C) (Oral)   Resp 16   Ht 1.651 m (5' 5\")   Wt 86.2 kg (190 lb)   SpO2 98%   Breastfeeding? Unknown   BMI 31.62 kg/m    UOP- 24hr 1300, Since  ml  Gen- A&O, NAD  Lungs- CTAB  CV-RRR  Abd- soft, non-tender, +BS, no rebound or guarding, fundus firm at umbilicus  Incision- C/D/I  Ext- non-tender, no edema. Pneumoboots in place lower extremities    Hemoglobin   Date Value Ref Range Status   2019 10.7 (L) 11.7 - 15.7 g/dL Final     O positive   Rubella immune    A/P:  28 year old  POD# 1 s/p repeat LTCS and bilateral salpingectomy for history of previous  section and desire for sterilization.  Doing well post operatively, no apparent complications.    1.  Routine post-op cares  2.  Analgesia  3.  Ambulate  4.  Discharge is anticipated in 2 days  5.  The plan of care was discussed with the patient.  She expressed understanding and agreement.       Abebe Juan  2019  9:21 AM   "

## 2019-06-05 NOTE — PLAN OF CARE
Pt went down to NICU in  to see  for 30 min.  Minimal assist needed to get back into bed.  Plan made w/ pt to allow her to rest until it is time for scheduled Toradol and discharge holland at that time.

## 2019-06-05 NOTE — PROVIDER NOTIFICATION
06/04/19 2106   Provider Notification   Provider Name/Title Dr. Arias   Method of Notification Phone   Request Evaluate - Remote   Notification Reason Other (Comment)   MDA updated on post op nausea, last dose of zofran given at 1623. MD states to give zofran 4 mg IV now. If patient still nauseous in one hour order received for phenergen 25 mg IM once mixed with ephedrine 25 mg IM once.

## 2019-06-06 LAB — COPATH REPORT: NORMAL

## 2019-06-06 PROCEDURE — 40000084 ZZH STATISTIC IP LACTATION SERVICES 16-30 MIN

## 2019-06-06 PROCEDURE — 12000000 ZZH R&B MED SURG/OB

## 2019-06-06 PROCEDURE — 25000132 ZZH RX MED GY IP 250 OP 250 PS 637: Performed by: OBSTETRICS & GYNECOLOGY

## 2019-06-06 RX ADMIN — IBUPROFEN 800 MG: 800 TABLET ORAL at 15:03

## 2019-06-06 RX ADMIN — OXYCODONE HYDROCHLORIDE 5 MG: 5 TABLET ORAL at 21:24

## 2019-06-06 RX ADMIN — OXYCODONE HYDROCHLORIDE 5 MG: 5 TABLET ORAL at 03:02

## 2019-06-06 RX ADMIN — ACETAMINOPHEN 975 MG: 325 TABLET, FILM COATED ORAL at 17:15

## 2019-06-06 RX ADMIN — ACETAMINOPHEN 975 MG: 325 TABLET, FILM COATED ORAL at 09:09

## 2019-06-06 RX ADMIN — IBUPROFEN 800 MG: 800 TABLET ORAL at 21:24

## 2019-06-06 RX ADMIN — SENNOSIDES AND DOCUSATE SODIUM 1 TABLET: 8.6; 5 TABLET ORAL at 21:24

## 2019-06-06 RX ADMIN — OXYCODONE HYDROCHLORIDE 10 MG: 5 TABLET ORAL at 11:56

## 2019-06-06 RX ADMIN — OXYCODONE HYDROCHLORIDE 10 MG: 5 TABLET ORAL at 09:09

## 2019-06-06 RX ADMIN — IBUPROFEN 800 MG: 800 TABLET ORAL at 01:37

## 2019-06-06 RX ADMIN — OXYCODONE HYDROCHLORIDE 5 MG: 5 TABLET ORAL at 17:54

## 2019-06-06 RX ADMIN — ACETAMINOPHEN 975 MG: 325 TABLET, FILM COATED ORAL at 01:07

## 2019-06-06 RX ADMIN — IBUPROFEN 800 MG: 800 TABLET ORAL at 07:47

## 2019-06-06 RX ADMIN — OXYCODONE HYDROCHLORIDE 5 MG: 5 TABLET ORAL at 06:13

## 2019-06-06 RX ADMIN — SENNOSIDES AND DOCUSATE SODIUM 2 TABLET: 8.6; 5 TABLET ORAL at 07:47

## 2019-06-06 RX ADMIN — OXYCODONE HYDROCHLORIDE 10 MG: 5 TABLET ORAL at 15:03

## 2019-06-06 NOTE — CONSULTS
[facility]  MATERNAL CHILD HEALTH   INITIAL NICU PSYCHOSOCIAL ASSESSMENT     DATA:     Reason for Social Work Consult: [place] in NICU at 35.4 weeks.    Presenting Information: SW met with Laura and Kip who are  and reside in [place] with their children Von 4 and Elif 2. Their  son is Ana and he is in the NICU for Respiratory failure. The couple is prepared for him at home and are not on WIC.      Social Support: Both extended families are nearby and supportive. 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    Insurance: Commercial, [facility]    Pediatrician: Dr. Myranda James with [facility]    Source of Financial Support: employment     Mental Health History: Laura has history of anxiety and depression with panic attacks. She has been treated with medication, Celexa and Xanax and therapy.  She scored 4 on EPDS with no thoughts of harm.    History of Postpartum Mood Disorders: Laura had postpartum depression after Tiana was born as Tiana was rushed to the NICU for a Pneumothorax which was traumatic for Laura. They were living in [place] at the time and didn't have family nearby and Kip was traveling for work.   Laura is not currently taking medication and feels better supported at this time.    Chemical Health History: None    INTERVENTION:       SW completed chart review and collaborated with the multidisciplinary team.     Psychosocial Assessment     Introduction to Maternal Child Health  role and scope of practice     Discussed NICU experience and gave NICU welcome card    Reviewed Hospital and Community Resources     Assessed Chemical Health History and Current Symptoms     Assessed Mental Health History and Current Symptoms     Identified stressors, barriers and family concerns     Provided support and active empathetic listening and validation.     Provided  psychoeducation on  mood and anxiety disorders, assessed for any current symptoms or history    Provided brochure Depression and Anxiety During and after Pregnancy.     ASSESSMENT:     Coping: well, MOB feels better being able to stay Bed and Board as she will discharge tomorrow 19.    Affect: Appropriate with good eye contact and some smiles.    Mood:  appropriate and stable    Motivation/Ability to Access Services: independent in accessing services.    Assessment of Support System: stable and involved    Level of engagement with SW: Engaged and appropriate. Able to seek out SW when needs arise.     Family s understanding of baby s medical situation: good grasp of the medical situation    Family and parent/infant interactions: Parents are supportive of each other and are bonding well with baby.    Assessment of parental risk for PPMD:Higher than average risk given history of PPMD after last pregnancy and unexpected NICU admission. However Laura has good insight into her mental health needs and Kip will help her. They are aware to seek assistance with any mental health needs.     Strengths:  caring family, willingness to accept help    Identified Barriers:   None at this time     PLAN:     SW will continue to follow throughout pt's Maternal-Child Health Journey as needs arise. SW will continue to collaborate with the multidisciplinary team.

## 2019-06-06 NOTE — PLAN OF CARE
VSS. UAL States she is voiding without difficulty. Continues to report moderate discomfort at incision site. Has been taking Ibuprofen, tylenol and oxycodone and states she is getting some relief with these. Also using abdominal binder which she states also helps some. Has spend much time this shift in NICU with baby. Requesting to see LC as she is discouraged with amount of colostrum she has been expressing.     Laura states she pumped with no colostrum obtained. Assisted her with hand expression and obtained .3 cc  EBM which was fed to infant. LC to see her today. Informed her of  Porter hand expression video which she states she will watch today.

## 2019-06-06 NOTE — PLAN OF CARE
Pain goal achieved with tylenol, ibuprofen and oxycodone. Patient ambulating independently. Tolerating a regular diet without N/V. Patient showered tonight and reports feeling better. Incisional dressing removed after shower with steri strips intact with no drainage. Active BS x 4. Patient is ambulating to NICU frequently. Patient is pumping every 4 hours with small amounts of EBM. Established goals for tonight.

## 2019-06-06 NOTE — LACTATION NOTE
Lactation visit. Mom requested LC visit as she has had a hx of low milk supply with her other babies. When questioned about actual supply amounts she reported she had enough for babies to grow but not extra to pump. Discussed how to pump for preemie and found she was using standard pumping rather than preemie settings so far. Assisted with best technique for hand expression and mom replicated well. She will continue to pump q 3 hours and now knows it is normal to have a bolus at the beginning and nothing now for a day or two. She reports feeling less anxious with this information. This is her 3rd baby so reassured her this should be her best supply with the 3rd baby. Enc GoLacta as an extra herbal to help with her supply as well. Encouraged mom to call us PRN.    Statement Selected

## 2019-06-06 NOTE — PLAN OF CARE
Pain management with ibuprofen, tylenol and oxycodone tonight. Provided an abdominal binder for comfort. VSS. Incision intact with steri-strips. Ambulated down to NICU x 2 tonight. Demonstrated hand expression with adequate EBM expressed. Patient was happy to see her milk. Encouraged patient to continue to pump every 3-4 hrs prn. Patient has the video ipad in the room for comfort.

## 2019-06-06 NOTE — PROGRESS NOTES
PHN attempted to meet with client but client declined as she was on her way to visit baby in NICU. PHN will attempt again tomorrow.

## 2019-06-06 NOTE — PROGRESS NOTES
"OB Post-op  Section Progress Note POD# 2    S:  Patient doing well.  Pain controlled with oral pain medication.  Ambulating.  Tolerating reg diet.  No N/V.  Passing flatus.  Voiding.  Bleeding is normal.  Baby in NICU for prematurity, now breathing on own with feeding tube in place.     O:  /84   Pulse 91   Temp 97.7  F (36.5  C) (Oral)   Resp 18   Ht 1.651 m (5' 5\")   Wt 86.2 kg (190 lb)   SpO2 98%   Breastfeeding? Unknown   BMI 31.62 kg/m      Gen- A&O, NAD  Abd- soft, non-tender, +BS, no rebound or guarding, fundus firm at umbilicus  Incision- C/D/I, stristrips in place  Ext- non-tender, no edema.     Hemoglobin   Date Value Ref Range Status   2019 10.7 (L) 11.7 - 15.7 g/dL Final     O +  Rubella immune    A/P:  28 year old  POD# 2 s/p  Repeat LTCS at 35 weeks for  labor    1.  Routine post-op cares  2.  Analgesia  3.  Ambulate  4.  Discharge home tomorrow, likely to bed and board    Sara Son  2019  8:44 AM   "

## 2019-06-07 VITALS
OXYGEN SATURATION: 98 % | DIASTOLIC BLOOD PRESSURE: 84 MMHG | WEIGHT: 190 LBS | SYSTOLIC BLOOD PRESSURE: 134 MMHG | BODY MASS INDEX: 31.65 KG/M2 | TEMPERATURE: 97.9 F | RESPIRATION RATE: 18 BRPM | HEART RATE: 66 BPM | HEIGHT: 65 IN

## 2019-06-07 PROCEDURE — 25000132 ZZH RX MED GY IP 250 OP 250 PS 637: Performed by: OBSTETRICS & GYNECOLOGY

## 2019-06-07 RX ORDER — OXYCODONE HYDROCHLORIDE 5 MG/1
5 TABLET ORAL EVERY 4 HOURS PRN
Qty: 20 TABLET | Refills: 0 | Status: SHIPPED | OUTPATIENT
Start: 2019-06-07 | End: 2021-02-14

## 2019-06-07 RX ORDER — IBUPROFEN 600 MG/1
600 TABLET, FILM COATED ORAL EVERY 6 HOURS PRN
COMMUNITY
Start: 2019-06-07 | End: 2021-02-14

## 2019-06-07 RX ORDER — AMOXICILLIN 250 MG
1 CAPSULE ORAL 2 TIMES DAILY PRN
COMMUNITY
Start: 2019-06-07 | End: 2021-02-14

## 2019-06-07 RX ORDER — ACETAMINOPHEN 325 MG/1
650 TABLET ORAL EVERY 6 HOURS PRN
COMMUNITY
Start: 2019-06-07 | End: 2021-02-14

## 2019-06-07 RX ADMIN — OXYCODONE HYDROCHLORIDE 5 MG: 5 TABLET ORAL at 09:42

## 2019-06-07 RX ADMIN — SENNOSIDES AND DOCUSATE SODIUM 2 TABLET: 8.6; 5 TABLET ORAL at 09:42

## 2019-06-07 RX ADMIN — OXYCODONE HYDROCHLORIDE 5 MG: 5 TABLET ORAL at 02:56

## 2019-06-07 RX ADMIN — OXYCODONE HYDROCHLORIDE 5 MG: 5 TABLET ORAL at 06:40

## 2019-06-07 RX ADMIN — IBUPROFEN 800 MG: 800 TABLET ORAL at 09:42

## 2019-06-07 RX ADMIN — OXYCODONE HYDROCHLORIDE 10 MG: 5 TABLET ORAL at 00:06

## 2019-06-07 RX ADMIN — ACETAMINOPHEN 975 MG: 325 TABLET, FILM COATED ORAL at 06:40

## 2019-06-07 RX ADMIN — IBUPROFEN 800 MG: 800 TABLET ORAL at 02:56

## 2019-06-07 RX ADMIN — OXYCODONE HYDROCHLORIDE 5 MG: 5 TABLET ORAL at 12:46

## 2019-06-07 NOTE — DISCHARGE INSTRUCTIONS
Lactation Consultant [phone]    Preeclampsia   Call your doctor right away if you have any of the following:  - Edema (swelling) in your face or hands  - Rapid weight gain-about 1 pound or more in a day  - Headache  - Abdominal pain on your right side  - Vision problems (flashes or spots)  - You have questions or concerns once you return home.Lactation Consultant [phone]    Postop  Birth Instructions    Activity       Do not lift more than 10 pounds for 6 weeks after surgery.  Ask family and friends for help when you need it.    No driving until you have stopped taking your pain medications (usually two weeks after surgery).    No heavy exercise or activity for 6 weeks.  Don't do anything that will put a strain on your surgery site.    Don't strain when using the toilet.  Your care team may prescribe a stool softener if you have problems with your bowel movements.     To care for your incision:       Keep the incision clean and dry.    Do not soak your incision in water. No swimming or hot tubs until it has fully healed. You may soak in the bathtub if the water level is below your incision.    Do not use peroxide, gel, cream, lotion, or ointment on your incision.    Adjust your clothes to avoid pressure on your surgery site (check the elastic in your underwear for example).     You may see a small amount of clear or pink drainage and this is normal.  Check with your health care provider:       If the drainage increases or has an odor.    If the incision reddens, you have swelling, or develop a rash.    If you have increased pain and the medicine we prescribed doesn't help.    If you have a fever above 100.4 F (38 C) with or without chills when placing thermometer under your tongue.   The area around your incision (surgery wound), will feel numb.  This is normal. The numbness should go away in less than a year.     Keep your hands clean:  Always wash your hands before touching your incision (surgery  wound). This helps reduce your risk of infection. If your hands aren't dirty, you may use an alcohol hand-rub to clean your hands. Keep your nails clean and short.    Call your healthcare provider if you have any of these symptoms:       You soak a sanitary pad with blood within 1 hour, or you see blood clots larger than a golf ball.    Bleeding that lasts more than 6 weeks.    Vaginal discharge that smells bad.    Severe pain, cramping or tenderness in your lower belly area.    A need to urinate more frequently (use the toilet more often), more urgently (use the toilet very quickly), or it burns when you urinate.    Nausea and vomiting.    Redness, swelling or pain around a vein in your leg.    Problems breastfeeding or a red or painful area on your breast.    Chest pain and cough or are gasping for air.    Problems with coping with sadness, anxiety or depression. If you have concerns about hurting yourself or the baby, call your provider immediately.      You have questions or concerns after you return home.

## 2019-06-07 NOTE — PROGRESS NOTES
[facility]   Obstetrics Post-Op / Progress Note             Interval History:   Doing well.  Pain is well-controlled with oral meds.  No fevers.   Good appetite.  Denies chest pain, shortness of breath, nausea or vomiting.  Ambulatory.  Voiding without difficulty. Passing flatus, no BM yet, taking stool softener. Pumping. Baby in NICU, plans to discharge today and board here. Reports she is coping okay. Denies HA, visual changes, or RUQ pain.            Review of Systems:    The Review of Systems is negative other than noted in the HPI          Medications:   All medications related to the patient's surgery have been reviewed          Physical Exam:     Patient Vitals for the past 24 hrs:   BP Temp Temp src Pulse Heart Rate Resp   19 1243 134/84 -- -- -- 59 18   19 0933 144/88 97.9  F (36.6  C) Oral -- 68 18   19 0001 135/75 98.1  F (36.7  C) Oral 66 -- 18   19 1754 125/76 -- -- 66 -- --   19 1715 (!) 148/92 98.5  F (36.9  C) Oral 62 -- 18   Abd: non tender, fundus firm  Wound clean and dry with minimal or no drainage.  Steri strips in place. Surrounding skin with minimal erythema.  Extremities: non tender, no erythema       Data:     All laboratory data related to this surgery reviewed  Hemoglobin   Date Value Ref Range Status   2019 10.7 (L) 11.7 - 15.7 g/dL Final   2019 11.6 (L) 11.7 - 15.7 g/dL Final   2019 11.2 (L) 11.7 - 15.7 g/dL Final   2018 14.4 11.7 - 15.7 g/dL Final   2016 11.4 (L) 11.7 - 15.7 g/dL Final     No imaging studies have been ordered         Assessment and Plan:    Assessment:   Post-operative day #3 s/p repeat LTCS and bilateral salpingectomy for history of previous  section and desire for sterilization  S/p  labor, baby in NICU  Pumping, plans breastfeeding  Borderline BP today, asymptomatic              Plan:   Pain control measures as needed  Reportable signs and symptoms dicussed with the patient  Discharge  today  Patient to schedule BP check in clinic in 4-5 days, reviewed pre-e symptoms and when to f/u.  Advised patient to follow up if needs further support as baby in NICU.       LORENZA Watkins CNM

## 2019-06-07 NOTE — PLAN OF CARE
Good pain management with ibuprofen and oxycodone tonight. Patient did decline scheduled tylenol dose. Patient ambulated down to NICU x 2 independently. VSS. Incision intact with steri-strips. Using abdominal binder for comfort. Continuing to pump with adequate amounts of EBM. Demonstrated hand expression tonight with 1 ml EBM expressed. Patient utilizing the video Ipad for comfort. Discussed option of bed and board with patient, patient undecided. Anticipate discharge today.

## 2019-06-07 NOTE — PLAN OF CARE
Spent much of shift in NICU with baby. States pain better controlled this shift, taking Ibuprofen, oxycodone and scheduled tylenol. Tolerating regular diet, ambulating in halls, denies difficulty voiding. Pumping, but tearful at minimal results. Reassured, consistancy encouraged. FOB present and supportive much of shift then home to care for other children.

## 2019-06-07 NOTE — PLAN OF CARE
UAL  BP's slightly elevated - other VSS. Denies headache, visual disturbances or epigastric pain. States she is voiding without difficulty. No BM yet.  Discussed need for stool softeners post op. Has spent much of day in NICU with baby. Laura states baby has a new infection so will need to stay longer. Discharge instructions completed. Patient states she understands all discharge instructions and all her questions have been answered. Verbalizes when she needs to return to the clinic for follow up for herself. She is caring for herself independently. Discharged to bed and board status at 1409. Baby remains in NICU.

## 2019-06-09 ENCOUNTER — APPOINTMENT (OUTPATIENT)
Dept: CT IMAGING | Facility: CLINIC | Age: 29
End: 2019-06-09
Attending: EMERGENCY MEDICINE
Payer: COMMERCIAL

## 2019-06-09 ENCOUNTER — HOSPITAL ENCOUNTER (EMERGENCY)
Facility: CLINIC | Age: 29
Discharge: HOME | End: 2019-06-09
Attending: EMERGENCY MEDICINE | Admitting: EMERGENCY MEDICINE
Payer: COMMERCIAL

## 2019-06-09 VITALS
OXYGEN SATURATION: 100 % | HEART RATE: 83 BPM | SYSTOLIC BLOOD PRESSURE: 167 MMHG | DIASTOLIC BLOOD PRESSURE: 110 MMHG | RESPIRATION RATE: 18 BRPM | WEIGHT: 180 LBS | BODY MASS INDEX: 29.95 KG/M2 | TEMPERATURE: 98.8 F

## 2019-06-09 DIAGNOSIS — R10.2 ABDOMINAL PAIN, SUPRAPUBIC: ICD-10-CM

## 2019-06-09 DIAGNOSIS — R03.0 ELEVATED BLOOD PRESSURE READING WITHOUT DIAGNOSIS OF HYPERTENSION: ICD-10-CM

## 2019-06-09 DIAGNOSIS — G89.18 ACUTE POST-OPERATIVE PAIN: ICD-10-CM

## 2019-06-09 LAB
ALBUMIN SERPL-MCNC: 2.9 G/DL (ref 3.4–5)
ALBUMIN UR-MCNC: 10 MG/DL
ALP SERPL-CCNC: 157 U/L (ref 40–150)
ALT SERPL W P-5'-P-CCNC: 32 U/L (ref 0–50)
ANION GAP SERPL CALCULATED.3IONS-SCNC: 7 MMOL/L (ref 3–14)
APPEARANCE UR: CLEAR
AST SERPL W P-5'-P-CCNC: 38 U/L (ref 0–45)
BACTERIA #/AREA URNS HPF: ABNORMAL /HPF
BASOPHILS # BLD AUTO: 0 10E9/L (ref 0–0.2)
BASOPHILS NFR BLD AUTO: 0.6 %
BILIRUB SERPL-MCNC: 0.5 MG/DL (ref 0.2–1.3)
BILIRUB UR QL STRIP: NEGATIVE
BUN SERPL-MCNC: 10 MG/DL (ref 7–30)
CALCIUM SERPL-MCNC: 8.6 MG/DL (ref 8.5–10.1)
CHLORIDE SERPL-SCNC: 110 MMOL/L (ref 94–109)
CO2 SERPL-SCNC: 24 MMOL/L (ref 20–32)
COLOR UR AUTO: ABNORMAL
CREAT SERPL-MCNC: 0.73 MG/DL (ref 0.52–1.04)
DIFFERENTIAL METHOD BLD: ABNORMAL
EOSINOPHIL # BLD AUTO: 0.3 10E9/L (ref 0–0.7)
EOSINOPHIL NFR BLD AUTO: 4.2 %
ERYTHROCYTE [DISTWIDTH] IN BLOOD BY AUTOMATED COUNT: 12.7 % (ref 10–15)
GFR SERPL CREATININE-BSD FRML MDRD: >90 ML/MIN/{1.73_M2}
GLUCOSE SERPL-MCNC: 84 MG/DL (ref 70–99)
GLUCOSE UR STRIP-MCNC: NEGATIVE MG/DL
HCT VFR BLD AUTO: 34.8 % (ref 35–47)
HGB BLD-MCNC: 11.4 G/DL (ref 11.7–15.7)
HGB UR QL STRIP: ABNORMAL
IMM GRANULOCYTES # BLD: 0.1 10E9/L (ref 0–0.4)
IMM GRANULOCYTES NFR BLD: 1.1 %
KETONES UR STRIP-MCNC: NEGATIVE MG/DL
LACTATE BLD-SCNC: 0.9 MMOL/L (ref 0.7–2)
LEUKOCYTE ESTERASE UR QL STRIP: ABNORMAL
LIPASE SERPL-CCNC: 87 U/L (ref 73–393)
LYMPHOCYTES # BLD AUTO: 1.7 10E9/L (ref 0.8–5.3)
LYMPHOCYTES NFR BLD AUTO: 26.1 %
MCH RBC QN AUTO: 28.7 PG (ref 26.5–33)
MCHC RBC AUTO-ENTMCNC: 32.8 G/DL (ref 31.5–36.5)
MCV RBC AUTO: 88 FL (ref 78–100)
MONOCYTES # BLD AUTO: 0.5 10E9/L (ref 0–1.3)
MONOCYTES NFR BLD AUTO: 7.1 %
MUCOUS THREADS #/AREA URNS LPF: PRESENT /LPF
NEUTROPHILS # BLD AUTO: 4 10E9/L (ref 1.6–8.3)
NEUTROPHILS NFR BLD AUTO: 60.9 %
NITRATE UR QL: NEGATIVE
NRBC # BLD AUTO: 0 10*3/UL
NRBC BLD AUTO-RTO: 0 /100
PH UR STRIP: 6 PH (ref 5–7)
PLATELET # BLD AUTO: 223 10E9/L (ref 150–450)
POTASSIUM SERPL-SCNC: 3.7 MMOL/L (ref 3.4–5.3)
PROT SERPL-MCNC: 7.1 G/DL (ref 6.8–8.8)
RBC # BLD AUTO: 3.97 10E12/L (ref 3.8–5.2)
RBC #/AREA URNS AUTO: 159 /HPF (ref 0–2)
SODIUM SERPL-SCNC: 141 MMOL/L (ref 133–144)
SOURCE: ABNORMAL
SP GR UR STRIP: 1.01 (ref 1–1.03)
SQUAMOUS #/AREA URNS AUTO: 2 /HPF (ref 0–1)
UROBILINOGEN UR STRIP-MCNC: NORMAL MG/DL (ref 0–2)
WBC # BLD AUTO: 6.6 10E9/L (ref 4–11)
WBC #/AREA URNS AUTO: 21 /HPF (ref 0–5)

## 2019-06-09 PROCEDURE — 96374 THER/PROPH/DIAG INJ IV PUSH: CPT | Mod: 59

## 2019-06-09 PROCEDURE — 74177 CT ABD & PELVIS W/CONTRAST: CPT

## 2019-06-09 PROCEDURE — 96376 TX/PRO/DX INJ SAME DRUG ADON: CPT

## 2019-06-09 PROCEDURE — 83690 ASSAY OF LIPASE: CPT | Performed by: EMERGENCY MEDICINE

## 2019-06-09 PROCEDURE — 80053 COMPREHEN METABOLIC PANEL: CPT | Performed by: EMERGENCY MEDICINE

## 2019-06-09 PROCEDURE — 87040 BLOOD CULTURE FOR BACTERIA: CPT | Mod: XS | Performed by: EMERGENCY MEDICINE

## 2019-06-09 PROCEDURE — 85025 COMPLETE CBC W/AUTO DIFF WBC: CPT | Performed by: EMERGENCY MEDICINE

## 2019-06-09 PROCEDURE — 99285 EMERGENCY DEPT VISIT HI MDM: CPT | Mod: 25

## 2019-06-09 PROCEDURE — 25000128 H RX IP 250 OP 636: Performed by: EMERGENCY MEDICINE

## 2019-06-09 PROCEDURE — 96361 HYDRATE IV INFUSION ADD-ON: CPT

## 2019-06-09 PROCEDURE — 87086 URINE CULTURE/COLONY COUNT: CPT | Performed by: EMERGENCY MEDICINE

## 2019-06-09 PROCEDURE — 83605 ASSAY OF LACTIC ACID: CPT | Performed by: EMERGENCY MEDICINE

## 2019-06-09 PROCEDURE — 81001 URINALYSIS AUTO W/SCOPE: CPT | Performed by: EMERGENCY MEDICINE

## 2019-06-09 RX ORDER — IOPAMIDOL 755 MG/ML
500 INJECTION, SOLUTION INTRAVASCULAR ONCE
Status: COMPLETED | OUTPATIENT
Start: 2019-06-09 | End: 2019-06-09

## 2019-06-09 RX ORDER — HYDROMORPHONE HYDROCHLORIDE 1 MG/ML
0.5 INJECTION, SOLUTION INTRAMUSCULAR; INTRAVENOUS; SUBCUTANEOUS
Status: DISCONTINUED | OUTPATIENT
Start: 2019-06-09 | End: 2019-06-09 | Stop reason: HOSPADM

## 2019-06-09 RX ORDER — MORPHINE SULFATE 4 MG/ML
4 INJECTION, SOLUTION INTRAMUSCULAR; INTRAVENOUS
Status: DISCONTINUED | OUTPATIENT
Start: 2019-06-09 | End: 2019-06-09

## 2019-06-09 RX ORDER — SODIUM CHLORIDE 9 MG/ML
1000 INJECTION, SOLUTION INTRAVENOUS CONTINUOUS
Status: DISCONTINUED | OUTPATIENT
Start: 2019-06-09 | End: 2019-06-09 | Stop reason: HOSPADM

## 2019-06-09 RX ADMIN — HYDROMORPHONE HYDROCHLORIDE 0.5 MG: 1 INJECTION, SOLUTION INTRAMUSCULAR; INTRAVENOUS; SUBCUTANEOUS at 07:50

## 2019-06-09 RX ADMIN — HYDROMORPHONE HYDROCHLORIDE 0.5 MG: 1 INJECTION, SOLUTION INTRAMUSCULAR; INTRAVENOUS; SUBCUTANEOUS at 08:54

## 2019-06-09 RX ADMIN — SODIUM CHLORIDE 1000 ML: 9 INJECTION, SOLUTION INTRAVENOUS at 07:51

## 2019-06-09 RX ADMIN — SODIUM CHLORIDE 63 ML: 9 INJECTION, SOLUTION INTRAVENOUS at 08:45

## 2019-06-09 RX ADMIN — IOPAMIDOL 91 ML: 755 INJECTION, SOLUTION INTRAVENOUS at 08:43

## 2019-06-09 ASSESSMENT — ENCOUNTER SYMPTOMS
FEVER: 0
DYSURIA: 0
COUGH: 0

## 2019-06-09 NOTE — ED AVS SNAPSHOT
[facility]  [address]  [facility] [address]  Phone:  [phone]  Fax:  [phone]                                    Laura Song   MRN: [id number]    Department:  [facility]   Date of Visit:  6/9/2019           After Visit Summary Signature Page    I have received my discharge instructions, and my questions have been answered. I have discussed any challenges I see with this plan with the nurse or doctor.    ..........................................................................................................................................  Patient/Patient Representative Signature      ..........................................................................................................................................  Patient Representative Print Name and Relationship to Patient    ..................................................               ................................................  Date                                   Time    ..........................................................................................................................................  Reviewed by Signature/Title    ...................................................              ..............................................  Date                                               Time          22EPIC Rev 08/18

## 2019-06-09 NOTE — ED TRIAGE NOTES
C section on tues, now worsening pain at site.  Unable to straighten without pain, unable to sit flat.

## 2019-06-09 NOTE — ED PROVIDER NOTES
History     Chief Complaint:  Post-op Problem      HPI   Laura Song is a 28 year old female with a history of endometriosis who presents to the emergency department for evaluation of pelvic pain s/p C section. The patient reports that after being discharged on 6/7/19 she was doing well and caring for her child in the NICU. Then, yesterday she had the onset of severe, intermittent pain that felt like postpartum contractions in her lower abdomen that radiates into her pelvis, which have been progressively worsening and are worse with any movement. She did not have any similar pain immediately after the C section, and did not have problems like this with her prior C sections. Her last bowel movement was yesterday and was normal. She has been alternating Tylenol and ibuprofen, and took one oxycodone last night with no improvement in the pain. The patient denies excessive bleeding, abnormal vaginal discharge, fevers, cough, dysuria, or drainage from the incision site.      Allergies:  Reglan      Medications:    Tylenol  Advil  Roxicodone  Senna-docusate    Percocet     Past Medical History:    Depressive disorder  Blood transfusion   Gastroenteritis   Allergic rhinitis   Headaches   ADHD  Panic disorder     Past Surgical History:    C section x3  Tonsillectomy  Endometriosis       Family History:    Thyroid disease  Diabetes  Osteoporosis  Depression  Anxiety   Stroke     Social History:  Tobacco Use: Never  Alcohol Use: No  PCP: Zhane Pritchard  Marital Status:        Review of Systems   Constitutional: Negative for fever.   Respiratory: Negative for cough.    Genitourinary: Positive for pelvic pain and vaginal bleeding (not excessive). Negative for dysuria and vaginal discharge.   All other systems reviewed and are negative.        Physical Exam     Patient Vitals for the past 24 hrs:   BP Temp Temp src Pulse Resp SpO2 Weight   06/09/19 0930 -- -- -- -- -- 100 % --   06/09/19 0915 -- -- -- -- -- 96  % --   19 0900 -- -- -- -- -- (!) 83 % --   19 0635 (!) 167/110 98.8  F (37.1  C) Temporal 83 18 97 % 81.6 kg (180 lb)     19 0945  133/85    Physical Exam  General: The patient is alert, in no respiratory distress, and in a large amount of pain.    HENT: Mucous membranes moist.    Cardiovascular: Regular rate and rhythm. Good pulses in all four extremities. Normal capillary refill and skin turgor.     Respiratory: Lungs are clear. No nasal flaring. No retractions. No wheezing, no crackles. When she takes a deep breath, she has increased pain.    Gastrointestinal: Movement of the bed causes pain. She has minimal to no lateral tenderness, minimal epigastric tenderness, and severe tenderness palpable to the low abdomen. Abdomen soft. No guarding, no rebound. No palpable hernias.     Musculoskeletal: No gross deformity.     Skin: No rashes or petechiae.  Low transverse incision on the abdomen with no drainage or redness.    Neurologic: The patient is alert and oriented x3. GCS 15. No testable cranial nerve deficit. Follows commands with clear and appropriate speech. Gives appropriate answers. Good strength in all extremities. No gross neurologic deficit. Gross sensation intact. Pupils are round and reactive. No meningismus.     Lymphatic: No cervical adenopathy. No lower extremity swelling.    Psychiatric: The patient is tearful.    Emergency Department Course   Imaging:  Abdomen/Pelvis CT with IV contrast:  IMPRESSION:   1. Small free fluid in the cul-de-sac. No convincing evidence for  abscess.  2. Abdominal wall findings and enlarged uterus consistent with recent   section. There is minimal air bubbles in the endometrial  cavity which can be seen with endometritis but may just be related to  recent  section.  Report per radiology.     Radiographic findings were communicated with the patient who voiced understanding of the findings.    Laboratory:  CBC: WBC: 6.6, HGB: 11.4 (L), PLT:  223  CMP: Chloride: 110 (H), Albumin: 2.9 (L), ALKPHOS: 157 (H), o/w WNL (Creatinine: 0.73)    UA: Clear light yellow urine, blood: large, protein albumin: 10, leukocyte esterase: small, WBC: 21 (H), RBC: 159 (H), bacteria: few, squamous epithelial: 2 (H), mucous: present, otherwise WNL  Urine culture: In process    0752 Blood Culture: In process  0735 Blood Culture: In process    0804 Lactic acid whole blood: 0.9    Lipase: 87    Interventions:  0750 Dilaudid 0.5 mg IV  0751 0.9% Sodium Chloride BOLUS 1000 mLs IV   0845 0.9% Sodium Chloride BOLUS 1000 mLs IV   0854 Dilaudid 0.5 mg IV    Emergency Department Course:  0719 Nursing notes and vitals reviewed. I performed an exam of the patient as documented above.     We discussed the fact that receiving any pain medication can show up in the breast milk.     IV inserted. Medicine administered as documented above. Blood drawn. This was sent to the lab for further testing, results above.    The patient provided a urine sample here in the emergency department. This was sent for laboratory testing, findings above.     The patient was sent for an abdomen/pelvis CT while in the emergency department, findings above.     0757 I consulted with Dr. Lipscomb, OB/GYN, regarding the patient's history and presentation here in the emergency department. She recommends a CT scan as an US would likely not provide enough information.    0833 I rechecked the patient and discussed the results of her workup thus far.     0921 I rechecked the patient and discussed the results of her workup thus far. She is feeling comfortable and I am re paging Dr. Lipscomb.     0944 I consulted with Dr. Buckner, OB/GYN who assessed the patient at bedside. She believes the patient is safe to be discharged back to the NICU.     Findings and plan explained to the Patient. Patient discharged home with instructions regarding supportive care, medications, and reasons to return. The importance of close follow-up  was reviewed.     I personally reviewed the laboratory results with the Patient and answered all related questions prior to discharge.     Impression & Plan    Medical Decision Making:  Laura Song is a 28 year old female who is 5 days s/p C section. It was a repeat section and sounded like it was very uneventful. She has been up in the NICU and doing a lot of walking and activity due to her pre term baby being up there, and presented for increased pain today. It is very positional but with her significant level of pain and elevated blood pressure, I discussed the case with Dr. Lipscomb of OB and we both felt that CT scan was indicated. On that CT, there was no signs of appendicitis. There was some air in the endometrial cavity but does not sound like endometritis and there is a normal while count and lactic acid and her pain is doing much better. She was evaluated in person by Dr. Buckner of OB and the patient will follow up. Blood pressure is down and she was discharged home in good condition with a non surgical abdomen. All findings were discussed with the patient.     Diagnosis:    ICD-10-CM    1. Abdominal pain, suprapubic R10.2    2. Acute post-operative pain G89.18    3. Elevated blood pressure reading without diagnosis of hypertension R03.0        Disposition:  Discharged to home    Scribe Disclosure:  I, Mendel Augustin, am serving as a scribe on 6/9/2019 at 7:19 AM to personally document services performed by Dr. Richmond MD based on my observations and the provider's statements to me.     Mendel Augustin  6/9/2019   [facility] EMERGENCY DEPARTMENT       Eder Fragoso MD  06/09/19 1134

## 2019-06-09 NOTE — CONSULTS
"OB/GYN CONSULT    Patient is being seen in consultation at the request the ER for low abdominal pain.    HPI:  Pt is a 28 year old  with s/p repeat  and bilateral salpingectomy on 19 (35 4/7, labor) who presented to the ER c/o severe lower abdominal pain.  Her symptoms began yesterday.  Pt initially did well post op and was sent home (bed and board) on POD #3. Disliked bed and board so now is at home though stayed in the NICU last night. Baby in NICU for prematurity at 35 4/7 weeks.    Pt reports onset low abd/pelvic cramping \"like a contraction\" last night. Intermittent, comes in waves. Radiates to vaginal at times. No nausea, emesis. Po fine. Normal BM yesterday. No flank pain. No fevers. No change in lochia which has always been fairly light No clots. No foul drainage. Incision without drainage. No increase in incisional pain. Pt took 5 mg oxycodone then another a few hours later with no relief. Taking 800 mg ibuprofen about q 8 hours. Has been walking from parking lot to NICU and back after drop off by family. Wonders if she has been doing too much since hospital discharge.    BP borderline on discharge  and was to come to office this week for BP check.     OBHx:  , last  with bilateral salpingectomy      Med Hx:   Past Medical History:   Diagnosis Date     Depressive disorder     anxiety/depression     History of blood transfusion     with 1st delivery       Surg Hx:    Past Surgical History:   Procedure Laterality Date     ABDOMEN SURGERY      c/section x2()      SECTION N/A 2019    Procedure:  SECTION;  Surgeon: Lorena Lipscomb MD;  Location: [facility] OR     ENT SURGERY      tonsillectomy     LAPAROSCOPY      for endometriosis       Meds:  Ibuprofen prn    Allergies:   Allergies   Allergen Reactions     Reglan [Metoclopramide] Anxiety       Soc Hx:   Social History     Socioeconomic History     Marital status:      Spouse name: Not on file     " Number of children: Not on file     Years of education: Not on file     Highest education level: Not on file   Occupational History     Not on file   Social Needs     Financial resource strain: Not on file     Food insecurity:     Worry: Not on file     Inability: Not on file     Transportation needs:     Medical: Not on file     Non-medical: Not on file   Tobacco Use     Smoking status: Never Smoker     Smokeless tobacco: Never Used   Substance and Sexual Activity     Alcohol use: Not Currently     Drug use: No     Sexual activity: Yes   Lifestyle     Physical activity:     Days per week: Not on file     Minutes per session: Not on file     Stress: Not on file   Relationships     Social connections:     Talks on phone: Not on file     Gets together: Not on file     Attends Rastafarian service: Not on file     Active member of club or organization: Not on file     Attends meetings of clubs or organizations: Not on file     Relationship status: Not on file     Intimate partner violence:     Fear of current or ex partner: Not on file     Emotionally abused: Not on file     Physically abused: Not on file     Forced sexual activity: Not on file   Other Topics Concern     Not on file   Social History Narrative     Not on file       Fam Hx: No family history on file.    PE:    VS:  BP (!) 167/110 pn admit, in pain. Rpts improved, last 133/85   Pulse 83   Temp 98.8  F (37.1  C) (Temporal)   Resp 18   Wt 81.6 kg (180 lb)   SpO2 97%   BMI 29.95 kg/m    Gen:  A&O, intermittently smiling, laughing, showing RN photos of baby, then cringing with cramps  Lungs:  CTAB  CV:  RRR  Abd:  Fundus at U - 3 FB. Intermittently tender to palpation but at other times non tender. Abdomen not distended. No rebound. Generally tender all quadrants during cramping, then not. Incision intact, no drainage, no erythema. Steri strips in place  Pelvic: attempted to do bimanual exam due to vaginal pain at times but pt does not tolerate. Note pt  did not tolerate vag exams in labor either. No vag masses or clots. Small amount old blood on peripad    LE non tender, min edema      Results     Procedure Component Value Units Date/Time   Urine Culture Aerobic Bacterial [[id number]] Collected: 06/09/19 0743   Specimen: Urine Updated: 06/09/19 0911   Comprehensive metabolic panel [[id number]] (Abnormal) Collected: 06/09/19 0649   Specimen: Blood Updated: 06/09/19 0811    Sodium 141 mmol/L     Potassium 3.7 mmol/L     Chloride 110High  mmol/L     Carbon Dioxide 24 mmol/L     Anion Gap 7 mmol/L     Glucose 84 mg/dL     Urea Nitrogen 10 mg/dL     Creatinine 0.73 mg/dL     GFR Estimate >90 mL/min/     GFR Estimate If Black >90 mL/min/     Calcium 8.6 mg/dL     Bilirubin Total 0.5 mg/dL     Albumin 2.9Low  g/dL     Protein Total 7.1 g/dL     Alkaline Phosphatase 157High  U/L     ALT 32 U/L     AST 38 U/L    Lipase [[id number]] Collected: 06/09/19 0649   Specimen: Blood Updated: 06/09/19 0811    Lipase 87 U/L    Lactic acid whole blood [[id number]] Collected: 06/09/19 0735   Specimen: Blood Updated: 06/09/19 0804    Lactic Acid 0.9 mmol/L    Blood culture [[id number]] Collected: 06/09/19 0752   Specimen: Blood Updated: 06/09/19 0800   UA with Microscopic [[id number]] (Abnormal) Collected: 06/09/19 0743   Specimen: Midstream Urine Updated: 06/09/19 0800    Color Urine Light Yellow    Appearance Urine Clear    Glucose Urine Negative mg/dL     Bilirubin Urine Negative    Ketones Urine Negative mg/dL     Specific Gravity Urine 1.010    Blood Urine LargeAbnormal     pH Urine 6.0 pH     Protein Albumin Urine 10Abnormal  mg/dL     Urobilinogen mg/dL Normal mg/dL     Nitrite Urine Negative    Leukocyte Esterase Urine SmallAbnormal     Source Midstream Urine    WBC Urine 21High  /HPF     RBC Urine 159High  /HPF     Bacteria Urine FewAbnormal  /HPF     Squamous Epithelial /HPF Urine 2High  /HPF     Mucous Urine PresentAbnormal  /LPF    CBC with platelets differential [[id number]]  (Abnormal) Collected: 19 0649   Specimen: Blood Updated: 19 0754    WBC 6.6 10e9/L     RBC Count 3.97 10e12/L     Hemoglobin 11.4Low  g/dL     Hematocrit 34.8Low  %     MCV 88 fl     MCH 28.7 pg     MCHC 32.8 g/dL     RDW 12.7 %     Platelet Count 223 10e9/L     Diff Method Automated Method    % Neutrophils 60.9 %     % Lymphocytes 26.1 %     % Monocytes 7.1 %     % Eosinophils 4.2 %     % Basophils 0.6 %     % Immature Granulocytes 1.1 %     Nucleated RBCs 0 /100     Absolute Neutrophil 4.0 10e9/L     Absolute Lymphocytes 1.7 10e9/L     Absolute Monocytes 0.5 10e9/L     Absolute Eosinophils 0.3 10e9/L     Absolute Basophils 0.0 10e9/L     Abs Immature Granulocytes 0.1 10e9/L          Imaging: CT ABDOMEN PELVIS WITH CONTRAST 2019 8:56 AM     TECHNIQUE: Images from diaphragm to pubic symphysis 91mL Isovue-370 IV  contrast.  Radiation dose for this scan was reduced using automated exposure  control, adjustment of the mA and/or kV according to patient size, or  iterative reconstruction technique.     HISTORY: Lower abdominal pain. Status post  on 2019,  postop day 5. Evaluate for abscess or fluid collection .     COMPARISON: 2018 CT abdomen and pelvis     FINDINGS:   Abdomen and Pelvis: Lung bases clear. Liver, spleen, gallbladder,  pancreas, adrenal glands and kidneys appear normal.     No periaortic or pelvic adenopathy. Postop changes and minimal  subcutaneous air in the low anterior abdominal wall and diffusely  enlarged uterus with minimal air in the lower endometrial cavity  consistent with recent  section. Cannot exclude endometritis  as a cause of minimal endometrial air, clinical correlation. There is  small fluid in the cul-de-sac but no evidence for rim-enhancing fluid  collection or convincing evidence for abscess. There is no fluid  collection in the anterior abdominal wall postoperative site.     No acute appearing bowel abnormality. Appendix is not  confidently  identified.                                                                      IMPRESSION:   1. Small free fluid in the cul-de-sac. No convincing evidence for  abscess.  2. Abdominal wall findings and enlarged uterus consistent with recent   section. There is minimal air bubbles in the endometrial  cavity which can be seen with endometritis but may just be related to  recent  section.       A/P:  28 year old  now post op day #5 with increased pelvic/abdominal cramping. No signs endometritis, sepsis, retained blood/hematometra, pelvic or abdominal hematoma or bleeding, incisional issue. Sx seem consistent with uterine cramping pains    BP elevated on initial eval in ER. Pre-e labs normal. BPs now improved to borderline, similar to BP on hospital discharge .     1. Discussed all findings with pt. No indication for surgical intervention at this point.No evidence infection requiring antibiotics. Offered to observe pt overnight for IV pain control but she declines.   2. OK to discharge pt from ER. She would like to return to the NICU to be with her baby.   3. Rec pt use wheelchair to travel from family car to NICU and back, for now.   4. Pt to call office in AM for f/u appt in the next few days for BP check and f/u of this cramping/pain  5.  Continue ibuprofen 800 mg po q 6 hours, tylenol prn. Oxycodone prn  6. Call and return to ER if pain increases or any new sx develop such as fever, n/v.     Marilynn Buckner  2019  9:48 AM

## 2019-06-10 LAB
BACTERIA SPEC CULT: NORMAL
BACTERIA SPEC CULT: NORMAL
Lab: NORMAL
SPECIMEN SOURCE: NORMAL

## 2019-06-11 ENCOUNTER — LACTATION ENCOUNTER (OUTPATIENT)
Age: 29
End: 2019-06-11

## 2019-06-11 NOTE — LACTATION NOTE
This note was copied from a baby's chart.  HERBER spoke with Laura in the NICU.  Given Lactation folder for handouts on getting started pumping for breast milk, collection of breast milk and cleaning of pumping parts. She will be placing red dots on her breast milk collection bottles.  I also set up pumping equipment for pumping at bedside in the NICU. Cleaning instructions and necessary equipment  Given.  Will continue to follow and support.

## 2019-06-12 NOTE — DISCHARGE SUMMARY
Hospital Discharge Summary      Date of admission: 2019  Date of discharge: 2019    Admission Dx:   - 35w4d    labor  - hx of CSx's 2 planning repeat  - planned salpingectomy     Discharge Dx:   - same    Hospital Procedures:   - RLTCS    Brief HPI:  Laura Song is a  who was admitted to the hospital on 2019 for surgical management of pregnancy in  labor.  She was evaluated in clinic by Dr. Moreau and the risks, benefits and alternatives of surgical management were discussed in detail with the patient.  She declined other forms of management.  Her past medical history is complicated by nothing.  Please see her preoperative H&P for full details of her history.    Hospital Course:  On 2019, Laura Song underwent a RLTCS with bilateral salpingectomy.  The procedure was uncomplicated.  EBL from the procedure was 500cc.  Findings at the time of the procedure included Single viable male infant at 1619 hours on 19 . Apgars of 6,7 and 8 at one and five and ten minutes.  Birth weight (GM): 3200 GM.  Fetal presentation: vtx.  Position: bettina  Amniotic fluid: clear.  Arterial Cord pH pending.  Placenta intact with 3 vessel cord.   Normal appearing uterus, fallopian tubes, ovaries.  No intraabdominal adhesions.  No abdominal wall adhesions.  For full details of the procedure, please see her operative note.      Her postoperative course was uncomplicated.  Her preoperative hgb was 11.6 and her postoperative hgb was 10.7.  She was asymptomatic from this drop.  By POD#3, she was ambulating without dizziness, tolerating a regular diet without nausea or vomiting, passing flatus, and her pain was well controlled on oral pain meds.  She requested discharge to home and was deemed appropriate and stable to do so.        Discharge instructions:    She was discharged to home on POD#3 with the following instructions:   - No lifting > 20 lbs x 6 weeks   - Nothing in the vagina x 6 weeks   -  No driving while on narcotic meds or for 2 weeks   - Call the clinic if you have a temp > 100.4, heavy vaginal bleeding, pain not controlled with oral pain meds, severe constipation, severe nausea or vomiting, or abnormal drainage from your incision or your vagina.    Discharge meds:  The patient was discharged to home with the following pain medications:   Laura Song   Home Medication Instructions PAULETTE:[id number]    Printed on:06/12/19 5513   Medication Information                      acetaminophen (TYLENOL) 325 MG tablet  Take 2 tablets (650 mg) by mouth every 6 hours as needed for other (multimodal surgical pain management along with NSAIDS and opioid medication as indicated based on pain control and physical function.)             ibuprofen (ADVIL/MOTRIN) 600 MG tablet  Take 1 tablet (600 mg) by mouth every 6 hours as needed for other (cramping)             oxyCODONE (ROXICODONE) 5 MG tablet  Take 1 tablet (5 mg) by mouth every 4 hours as needed for moderate to severe pain             Prenatal Vit-Fe Fumarate-FA (PRENATAL MULTIVITAMIN W/IRON) 27-0.8 MG tablet  Take 1 tablet by mouth daily             senna-docusate (SENOKOT-S/PERICOLACE) 8.6-50 MG tablet  Take 1 tablet by mouth 2 times daily as needed for constipation                 The patient was instructed to continue her home meds as previously prescribed by her primary MD.    Follow-up:  The patient was instructed to follow-up with Dr. Moreau in 6 weeks for a routine post-operative visit.      Moo Moreau

## 2019-06-15 LAB
BACTERIA SPEC CULT: NO GROWTH
BACTERIA SPEC CULT: NO GROWTH
Lab: NORMAL
Lab: NORMAL
SPECIMEN SOURCE: NORMAL
SPECIMEN SOURCE: NORMAL

## 2020-02-03 NOTE — PLAN OF CARE
Data: Patient assessed in the [facility] for hypertension disorder of pregnancy.  Cervical exam not examined.  Membranes intact.  Contractions none.  Action:  Presumed adequate fetal oxygenation documented (see flow record). Discharge instructions reviewed.  Patient instructed to report change in fetal movement, vaginal leaking of fluid or bleeding, abdominal pain, or any concerns related to the pregnancy to her nurse/physician.    Response: Orders to discharge home per Allyssa Shah.  Patient verbalized understanding of education and verbalized agreement with plan. Discharged to home at 2254.      Normal vision: sees adequately in most situations; can see medication labels, newsprint

## 2021-02-14 ENCOUNTER — NURSE TRIAGE (OUTPATIENT)
Dept: NURSING | Facility: CLINIC | Age: 31
End: 2021-02-14

## 2021-02-14 ENCOUNTER — HOSPITAL ENCOUNTER (EMERGENCY)
Facility: CLINIC | Age: 31
Discharge: HOME | End: 2021-02-14
Attending: EMERGENCY MEDICINE | Admitting: EMERGENCY MEDICINE
Payer: COMMERCIAL

## 2021-02-14 ENCOUNTER — APPOINTMENT (OUTPATIENT)
Dept: GENERAL RADIOLOGY | Facility: CLINIC | Age: 31
End: 2021-02-14
Attending: EMERGENCY MEDICINE
Payer: COMMERCIAL

## 2021-02-14 VITALS
WEIGHT: 150 LBS | OXYGEN SATURATION: 99 % | HEART RATE: 83 BPM | RESPIRATION RATE: 18 BRPM | SYSTOLIC BLOOD PRESSURE: 133 MMHG | BODY MASS INDEX: 24.99 KG/M2 | DIASTOLIC BLOOD PRESSURE: 92 MMHG | HEIGHT: 65 IN | TEMPERATURE: 97.6 F

## 2021-02-14 DIAGNOSIS — S63.502A WRIST SPRAIN, LEFT, INITIAL ENCOUNTER: ICD-10-CM

## 2021-02-14 PROCEDURE — 29125 APPL SHORT ARM SPLINT STATIC: CPT | Mod: LT

## 2021-02-14 PROCEDURE — 99285 EMERGENCY DEPT VISIT HI MDM: CPT

## 2021-02-14 PROCEDURE — 73110 X-RAY EXAM OF WRIST: CPT | Mod: LT

## 2021-02-14 ASSESSMENT — ENCOUNTER SYMPTOMS: ARTHRALGIAS: 1

## 2021-02-14 ASSESSMENT — MIFFLIN-ST. JEOR: SCORE: 1406.28

## 2021-02-14 NOTE — ED PROVIDER NOTES
"History     Chief Complaint:  Wrist Pain     HPI  Laura Song is a 30 year old female with a history of anxiety who presents for evaluation of left wrist pain following a fall.  The patient states that she tripped and fell this morning, catching herself with her left arm, and began experiencing pain in her wrist.  She states that it hurts to move her fingers, and that there is a tingling sensation in her digits, but denies any elbow pain.  She has not taken any medications for her pain yet.    She declines any Tylenol and Advil but would like an ice pack.    Allergies:  Reglan [Metoclopramide]    Medications:   Prenatal vitamin    Medical History:   Anxiety  Depression  Endometriosis  Acute gastroenteritis    Surgical History   Tonsillectomy  C section x3  Laparoscopy for endometriosis  [place] teeth extraction    Family History:   Mother: anxiety, thyroid disease    Social History:  Patient presents to ED alone.  PCP: Zhane Pritchard    Review of Systems   Musculoskeletal: Positive for arthralgias (wrist pain).        No elbow pain   Neurological:        Tingling   All other systems reviewed and are negative.      Physical Exam     Patient Vitals for the past 24 hrs:   BP Temp Temp src Pulse Resp SpO2 Height Weight   02/14/21 0516 133/92 97.6  F (36.4  C) Oral 83 18 99 % 1.651 m (5' 5\") 68 kg (150 lb)      Physical Exam   Nursing note and vitals reviewed.  Constitutional: Cooperative.   Cardiovascular: Normal rate, regular rhythm. 2+ left radial pulse  Pulmonary/Chest: Effort normal.   Musculoskeletal: LUE:  Tenderness to distal radius.  No bony tenderness to metacarpals.  Full range of motion of elbow, pain with ROM of wrist.  No tenderness to elbow.    Neurological: Alert. Normal sensation to all fingers.  Skin: Skin is warm and dry.    Psychiatric: Normal mood and affect.     Emergency Department Course     Imaging:    XR Wrist Left G/E 3 Views  IMPRESSION: No acute fracture or dislocation. Congruent " carpal alignment.  Reading per radiology     Emergency Department Course:     Reviewed:  I reviewed the patient's nursing notes, vitals, past medical records, Care Everywhere.      Assessments:  0519 I preformed my initial assessment of the patient.    0558 Patient rechecked and updated.       Disposition:  Discharged to home.     Impression & Plan     Medical Decision Making:  Laura Song is a 30 year old female who presents for evaluation of wrist pain following a mechanical fall.  She is tender over anatomical snuff box region.  X-rays were obtained and show no fracture or dislocation.  The patient is neurologically intact and has brisk capillary refill and strong pulses.  The patient was offered a thumb spika splint for comfort which they did accept.  ED tech placed the splint. I informed the patient that it is possible that there is an occult fracture not seen and that if they have persistent pain, that they need to follow up with orthopedics for repeat imaging.  Otherwise, they may follow up with their PCP.  The patient was given return precautions and follow up instructions and state understanding of these and have the ability to comply.    Diagnosis:     ICD-10-CM    1. Wrist sprain, left, initial encounter  S63.502A         Disposition:  Discharged to home.      Scribe Disclosure:  Amina CASIANO, am serving as a scribe at 5:25 AM on 2/14/2021 to document services personally performed by Dimas Duong MD based on my observations and the provider's statements to me.     Dimas Negrete RD, MD  02/14/21 0643

## 2021-02-14 NOTE — TELEPHONE ENCOUNTER
Caller report she was seen in ED this morning for a wrist injury from a fall; states  She is unable to manage pain with OTC analgesia nd  Wrist splint; pain increases with any  movement of  Elbow or hand   has catherine icing an adjusting splint without relief   Triage protocol reviewed   Advised to be seen for pain management   caller inquiries if it is okay to proceed to orthopedic  Walk in clinic; advised that that is good choice as she  was referred to Ortho for follow up   caller will proceed   Mckayla Maurice RN  FNA       Additional Information    Negative: Serious injury with multiple fractures    Negative: [1] Major bleeding (e.g., actively dripping or spurting) AND [2] can't be stopped    Negative: Sounds like a life-threatening emergency to the triager    Negative: Wound looks infected    Negative: Arm pain from overuse (e.g., sports, lifting, physical work)    Negative: Arm pain not from an injury    Negative: Shoulder injury is main concern    Negative: Elbow injury is main concern    Negative: Wrist or hand injury is main concern    Negative: Finger injury is main concern    Negative: Bullet wound, stabbed by knife, or other serious penetrating wound    Negative: Looks like a broken bone (crooked or deformed)    Negative: Looks like a dislocated joint    Negative: Can't move injured arm at all    Negative: [1] Bleeding AND [2] won't stop after 10 minutes of direct pressure (using correct technique)    Negative: Skin is split open or gaping (or length > 1/2 inch or 12 mm)    Negative: [1] Dirt in the wound AND [2] not removed with 15 minutes of scrubbing    Negative: Sounds like a serious injury to the triager    Negative: [1] Large swelling or bruise around joint (wrist, elbow, shoulder) AND [2] can't move injured arm normally (bend or straighten completely)    Negative: [1] After day 2 AND [2] pain at site of injury becomes worse AND [3] unexplained fever occurs    Negative: Suspicious history for the  injury    [1] SEVERE pain AND [2] not improved 2 hours after pain medicine/ice packs    Protocols used: ARM INJURY-A-AH

## 2021-02-14 NOTE — ED TRIAGE NOTES
Trip and fell down, brace herself with her left arm and injured left wrist. Movement with fingers worsen wrist pain associated with tingling. ABCs intact.

## 2023-01-11 NOTE — PROGRESS NOTES
Public Health Nurse (PHN) met with patient, discussed resources within [facility].  Provided family resources of [facility] Public Health services resource card, home visiting card, community resource guide and car seat information card given and discussed.  Offered referral for home visiting, family declined - patient states she would like to contact public health when she knows more about when her infant will be going home. PHN explained how to self-refer and provided PH contact information. Patient declined any questions or concerns.    What Was Performed First?: Curettage

## 2023-05-15 ENCOUNTER — RX ONLY (OUTPATIENT)
Age: 33
Setting detail: RX ONLY
End: 2023-05-15

## 2024-08-01 ENCOUNTER — APPOINTMENT (RX ONLY)
Dept: URBAN - METROPOLITAN AREA CLINIC 119 | Facility: CLINIC | Age: 34
Setting detail: DERMATOLOGY
End: 2024-08-01

## 2024-08-01 DIAGNOSIS — B07.8 OTHER VIRAL WARTS: ICD-10-CM

## 2024-08-01 PROCEDURE — ? COUNSELING

## 2024-08-01 PROCEDURE — ? LIQUID NITROGEN

## 2024-08-01 PROCEDURE — 17110 DESTRUCTION B9 LES UP TO 14: CPT

## 2024-08-01 ASSESSMENT — LOCATION DETAILED DESCRIPTION DERM
LOCATION DETAILED: RIGHT PLANTAR FOREFOOT OVERLYING 4TH METATARSAL
LOCATION DETAILED: RIGHT LATERAL PLANTAR 1ST TOE
LOCATION DETAILED: RIGHT PLANTAR FOREFOOT OVERLYING 5TH METATARSAL
LOCATION DETAILED: LEFT INSTEP
LOCATION DETAILED: RIGHT PLANTAR FOREFOOT OVERLYING 2ND METATARSAL

## 2024-08-01 ASSESSMENT — TOTAL NUMBER OF VERRUCA VULGARIS: # OF LESIONS?: 7

## 2024-08-01 ASSESSMENT — LOCATION SIMPLE DESCRIPTION DERM
LOCATION SIMPLE: LEFT PLANTAR SURFACE
LOCATION SIMPLE: PLANTAR SURFACE OF RIGHT 1ST TOE
LOCATION SIMPLE: RIGHT PLANTAR SURFACE

## 2024-08-01 ASSESSMENT — AREA OF WARTS IN CM2: TOTAL AREA OF ALL WARTS IN CM2: 1

## 2024-08-01 ASSESSMENT — LOCATION ZONE DERM
LOCATION ZONE: TOE
LOCATION ZONE: FEET

## 2024-08-01 NOTE — PROCEDURE: LIQUID NITROGEN
Medical Necessity Clause: This procedure was medically necessary because the lesions that were treated were:
Detail Level: Detailed
Show Spray Paint Technique Variable?: Yes
Duration Of Freeze Thaw-Cycle (Seconds): 10-15
Post-Care Instructions: I reviewed with the patient in detail post-care instructions. Patient is to wear sunprotection, and avoid picking at any of the treated lesions. Pt may apply Vaseline to crusted or scabbing areas.
Add 52 Modifier (Optional): no
Spray Paint Text: The liquid nitrogen was applied to the skin utilizing a spray paint frosting technique.
Pared With?: Dermablade
Consent: The patient's consent was obtained including but not limited to risks of crusting, scabbing, blistering, scarring, darker or lighter pigmentary change, recurrence, incomplete removal and infection.
Application Tool (Optional): Cry-AC
Medical Necessity Information: It is in your best interest to select a reason for this procedure from the list below. All of these items fulfill various CMS LCD requirements except the new and changing color options.
Number Of Freeze-Thaw Cycles: 3 freeze-thaw cycles

## (undated) DEVICE — SOLIDIFIER FLUID RED 1500ML LIQUID KIT SYS POWDER ISOB1500

## (undated) DEVICE — BAG CLEAR TRASH 1.3M 39X33" P4040C

## (undated) DEVICE — DRSG TEGADERM 4X10" 1627

## (undated) DEVICE — LINEN BABY BLANKET 5434

## (undated) DEVICE — GLOVE PROTEXIS W/NEU-THERA 6.5  2D73TE65

## (undated) DEVICE — TRANSFER DEVICE BLOOD NDL HOLDER 364880

## (undated) DEVICE — LINEN TOWEL PACK X10 5473

## (undated) DEVICE — SU VICRYL 0 CT 36" J358H

## (undated) DEVICE — ESU GROUND PAD ADULT W/CORD E7507

## (undated) DEVICE — SU VICRYL 0 CT-1 36" J346H

## (undated) DEVICE — GLOVE PROTEXIS BLUE W/NEU-THERA 8.0  2D73EB80

## (undated) DEVICE — SUCTION CANISTER MEDIVAC LINER 3000ML W/LID 65651-530

## (undated) DEVICE — SOL NACL 0.9% IRRIG 1000ML BOTTLE 2F7124

## (undated) DEVICE — LINEN FULL SHEET 5511

## (undated) DEVICE — SU VICRYL 3-0 CT-1 36" J344H

## (undated) DEVICE — CAP BABY PINK/BLUE IC-2

## (undated) DEVICE — LINEN HALF SHEET 5512

## (undated) DEVICE — SU VICRYL 4-0 PS-2 18" UND J496H

## (undated) DEVICE — BLADE CLIPPER SGL USE 9680

## (undated) DEVICE — PREP CHLORAPREP 26ML TINTED ORANGE  260815

## (undated) DEVICE — GLOVE PROTEXIS W/NEU-THERA 7.5  2D73TE75

## (undated) DEVICE — SOL WATER IRRIG 1000ML BOTTLE 2F7114

## (undated) DEVICE — PACK C-SECTION LF PL15OTA83B

## (undated) DEVICE — CATH TRAY FOLEY SURESTEP 16FR DRAIN BAG STATOCK A899916

## (undated) DEVICE — STOCKING SLEEVE VASOPRESS COMPRESSION CALF MED 18" VP501M

## (undated) DEVICE — SU MONOCRYL 0 CT-1 36" UND Y946H

## (undated) DEVICE — DRSG STERI STRIP 1/2X4" R1547

## (undated) RX ORDER — OXYTOCIN/0.9 % SODIUM CHLORIDE 30/500 ML
PLASTIC BAG, INJECTION (ML) INTRAVENOUS
Status: DISPENSED
Start: 2019-06-04

## (undated) RX ORDER — MORPHINE SULFATE 1 MG/ML
INJECTION, SOLUTION EPIDURAL; INTRATHECAL; INTRAVENOUS
Status: DISPENSED
Start: 2019-06-04